# Patient Record
Sex: MALE | Race: WHITE | NOT HISPANIC OR LATINO
[De-identification: names, ages, dates, MRNs, and addresses within clinical notes are randomized per-mention and may not be internally consistent; named-entity substitution may affect disease eponyms.]

---

## 2019-05-30 ENCOUNTER — APPOINTMENT (OUTPATIENT)
Dept: FAMILY MEDICINE | Facility: CLINIC | Age: 54
End: 2019-05-30
Payer: COMMERCIAL

## 2019-05-30 VITALS
HEART RATE: 85 BPM | RESPIRATION RATE: 14 BRPM | HEIGHT: 70 IN | BODY MASS INDEX: 27.92 KG/M2 | DIASTOLIC BLOOD PRESSURE: 80 MMHG | SYSTOLIC BLOOD PRESSURE: 110 MMHG | WEIGHT: 195 LBS | OXYGEN SATURATION: 96 %

## 2019-05-30 PROCEDURE — 99213 OFFICE O/P EST LOW 20 MIN: CPT

## 2019-05-30 RX ORDER — LANCETS
EACH MISCELLANEOUS
Qty: 100 | Refills: 0 | Status: ACTIVE | COMMUNITY
Start: 2019-03-14

## 2019-05-30 RX ORDER — ALPRAZOLAM 0.25 MG/1
0.25 TABLET ORAL
Qty: 68 | Refills: 0 | Status: DISCONTINUED | COMMUNITY
Start: 2019-01-29 | End: 2019-05-30

## 2019-05-30 RX ORDER — BLOOD-GLUCOSE METER
W/DEVICE EACH MISCELLANEOUS
Qty: 1 | Refills: 0 | Status: ACTIVE | COMMUNITY
Start: 2019-03-14

## 2019-05-30 NOTE — PHYSICAL EXAM
[No Acute Distress] : no acute distress [Well Nourished] : well nourished [Well Developed] : well developed [Well-Appearing] : well-appearing [No JVD] : no jugular venous distention [Supple] : supple [No Lymphadenopathy] : no lymphadenopathy [No Respiratory Distress] : no respiratory distress  [Clear to Auscultation] : lungs were clear to auscultation bilaterally [Normal Rate] : normal rate  [Regular Rhythm] : with a regular rhythm [No Murmur] : no murmur heard [No Carotid Bruits] : no carotid bruits [Normal Posterior Cervical Nodes] : no posterior cervical lymphadenopathy [Normal Anterior Cervical Nodes] : no anterior cervical lymphadenopathy [Speech Grossly Normal] : speech grossly normal [Memory Grossly Normal] : memory grossly normal [Alert and Oriented x3] : oriented to person, place, and time [Normal Insight/Judgement] : insight and judgment were intact [de-identified] : anxious

## 2019-05-30 NOTE — HISTORY OF PRESENT ILLNESS
[FreeTextEntry1] : Patient present for med check\par  [de-identified] : Here for management of NIDDM and anxiety.

## 2019-05-30 NOTE — ASSESSMENT
[FreeTextEntry1] : 1. Diet and use of meds reviewed.  Lab due next visit. 2. Rx for alprazolam 0.5 mg  Use/precautions/safety of medication all reviewed.  [place]  checked\par

## 2019-05-30 NOTE — REVIEW OF SYSTEMS
[Joint Pain] : joint pain [Joint Stiffness] : joint stiffness [Anxiety] : anxiety [Negative] : Heme/Lymph

## 2019-08-19 ENCOUNTER — MEDICATION RENEWAL (OUTPATIENT)
Age: 54
End: 2019-08-19

## 2019-08-30 ENCOUNTER — APPOINTMENT (OUTPATIENT)
Dept: FAMILY MEDICINE | Facility: CLINIC | Age: 54
End: 2019-08-30
Payer: COMMERCIAL

## 2019-08-30 VITALS
OXYGEN SATURATION: 97 % | BODY MASS INDEX: 27.92 KG/M2 | WEIGHT: 195 LBS | HEIGHT: 70 IN | HEART RATE: 75 BPM | SYSTOLIC BLOOD PRESSURE: 120 MMHG | RESPIRATION RATE: 14 BRPM | DIASTOLIC BLOOD PRESSURE: 84 MMHG

## 2019-08-30 DIAGNOSIS — Z80.0 FAMILY HISTORY OF MALIGNANT NEOPLASM OF DIGESTIVE ORGANS: ICD-10-CM

## 2019-08-30 DIAGNOSIS — Z83.79 FAMILY HISTORY OF OTHER DISEASES OF THE DIGESTIVE SYSTEM: ICD-10-CM

## 2019-08-30 DIAGNOSIS — Z82.49 FAMILY HISTORY OF ISCHEMIC HEART DISEASE AND OTHER DISEASES OF THE CIRCULATORY SYSTEM: ICD-10-CM

## 2019-08-30 PROCEDURE — 99214 OFFICE O/P EST MOD 30 MIN: CPT | Mod: 25

## 2019-08-30 PROCEDURE — 36415 COLL VENOUS BLD VENIPUNCTURE: CPT

## 2019-08-30 NOTE — ASSESSMENT
[FreeTextEntry1] : Check labs drawn in office today for above assessed conditions. Labs to be resulted at the [facility] core lab.\par Increase dietary effort - low carbs\par RTO in 3 mos for follow up\par Advised diabetic eye exam and colonoscopy - pt agreeable to go. \par \par - Patient advised of harmful side effects and use of dependence with long term use of benzodiazepines. Advised caution related to sedative effect and and respiratory depression. Advised not to take while driving or in combination with alcohol. Meditation, mindfulness, exercise, and psychotherapy encouraged. ISTOP checked. Dr. Perez renewed on 8/19/19.\par

## 2019-08-30 NOTE — HISTORY OF PRESENT ILLNESS
[FreeTextEntry1] : patient presents for medication renewal\par  [de-identified] : Patient reports today for follow up and medication renewals. Patient reports taking xanax for his anxiety and stress. States he only take when really needed. Denies any suicidal thoughts or plans, side effects or aberrant behaviors. Pt states he has been taking his metformin but has not been as strict on his diet recently.

## 2019-08-30 NOTE — PHYSICAL EXAM
[No Edema] : there was no peripheral edema [Normal] : affect was normal and insight and judgment were intact [Comprehensive Foot Exam Normal] : Right and left foot were examined and both feet are normal. No ulcers in either foot. Toes are normal and with full ROM.  Normal tactile sensation with monofilament testing throughout both feet

## 2019-09-04 LAB
ALBUMIN SERPL ELPH-MCNC: 4.7 G/DL
ALP BLD-CCNC: 67 U/L
ALT SERPL-CCNC: 31 U/L
ANION GAP SERPL CALC-SCNC: 14 MMOL/L
AST SERPL-CCNC: 21 U/L
BASOPHILS # BLD AUTO: 0.05 K/UL
BASOPHILS NFR BLD AUTO: 1.1 %
BILIRUB SERPL-MCNC: 0.6 MG/DL
BUN SERPL-MCNC: 15 MG/DL
CALCIUM SERPL-MCNC: 9.5 MG/DL
CHLORIDE SERPL-SCNC: 96 MMOL/L
CO2 SERPL-SCNC: 28 MMOL/L
CREAT SERPL-MCNC: 0.92 MG/DL
EOSINOPHIL # BLD AUTO: 0.23 K/UL
EOSINOPHIL NFR BLD AUTO: 4.9 %
ESTIMATED AVERAGE GLUCOSE: 154 MG/DL
GLUCOSE SERPL-MCNC: 236 MG/DL
HBA1C MFR BLD HPLC: 7 %
HCT VFR BLD CALC: 43.1 %
HCV AB SER QL: NONREACTIVE
HCV S/CO RATIO: 0.09 S/CO
HGB BLD-MCNC: 15.6 G/DL
IMM GRANULOCYTES NFR BLD AUTO: 0.2 %
LYMPHOCYTES # BLD AUTO: 1.55 K/UL
LYMPHOCYTES NFR BLD AUTO: 33 %
MAN DIFF?: NORMAL
MCHC RBC-ENTMCNC: 31.6 PG
MCHC RBC-ENTMCNC: 36.2 GM/DL
MCV RBC AUTO: 87.4 FL
MONOCYTES # BLD AUTO: 0.37 K/UL
MONOCYTES NFR BLD AUTO: 7.9 %
NEUTROPHILS # BLD AUTO: 2.48 K/UL
NEUTROPHILS NFR BLD AUTO: 52.9 %
PLATELET # BLD AUTO: 182 K/UL
POTASSIUM SERPL-SCNC: 4.5 MMOL/L
PROT SERPL-MCNC: 6.8 G/DL
RBC # BLD: 4.93 M/UL
RBC # FLD: 12 %
SODIUM SERPL-SCNC: 138 MMOL/L
T4 SERPL-MCNC: 5.3 UG/DL
TSH SERPL-ACNC: 2.64 UIU/ML
WBC # FLD AUTO: 4.69 K/UL

## 2019-11-19 ENCOUNTER — MEDICATION RENEWAL (OUTPATIENT)
Age: 54
End: 2019-11-19

## 2019-12-02 ENCOUNTER — APPOINTMENT (OUTPATIENT)
Dept: FAMILY MEDICINE | Facility: CLINIC | Age: 54
End: 2019-12-02
Payer: COMMERCIAL

## 2019-12-02 VITALS
DIASTOLIC BLOOD PRESSURE: 82 MMHG | RESPIRATION RATE: 14 BRPM | WEIGHT: 195 LBS | SYSTOLIC BLOOD PRESSURE: 116 MMHG | BODY MASS INDEX: 27.92 KG/M2 | HEIGHT: 70 IN | OXYGEN SATURATION: 97 % | HEART RATE: 64 BPM

## 2019-12-02 DIAGNOSIS — Z11.3 ENCOUNTER FOR SCREENING FOR INFECTIONS WITH A PREDOMINANTLY SEXUAL MODE OF TRANSMISSION: ICD-10-CM

## 2019-12-02 DIAGNOSIS — Z12.11 ENCOUNTER FOR SCREENING FOR MALIGNANT NEOPLASM OF COLON: ICD-10-CM

## 2019-12-02 PROCEDURE — 99214 OFFICE O/P EST MOD 30 MIN: CPT

## 2019-12-02 NOTE — ASSESSMENT
[FreeTextEntry1] : Fit test given, agreeable to complete\par Advised to get diabetic eye exam \par Check labs - rx given \par - Patient advised of harmful side effects and risk of dependence with long term use of benzodiazepines. Advised caution related to sedative effect and and respiratory depression. Advised not to take while driving or in combination with alcohol. Meditation, mindfulness, exercise, and psychotherapy encouraged. ISTOP checked.Reference #: [id number] \par

## 2019-12-02 NOTE — HISTORY OF PRESENT ILLNESS
[FreeTextEntry1] : Patient presents for med check\par  [de-identified] : Patient reports today for follow up and medication renewals. Patient reports taking xanax for his anxiety and stress. He is very bust and stressed with work. States he only take when really needed. Denies any suicidal thoughts or plans, side effects or aberrant behaviors. Pt states he has been taking his metformin but has not been as strict on his diet recently.

## 2020-01-08 LAB — HEMOCCULT STL QL IA: POSITIVE

## 2020-02-13 ENCOUNTER — APPOINTMENT (OUTPATIENT)
Dept: FAMILY MEDICINE | Facility: CLINIC | Age: 55
End: 2020-02-13
Payer: COMMERCIAL

## 2020-02-13 VITALS
TEMPERATURE: 98.2 F | WEIGHT: 193 LBS | SYSTOLIC BLOOD PRESSURE: 110 MMHG | DIASTOLIC BLOOD PRESSURE: 70 MMHG | OXYGEN SATURATION: 95 % | BODY MASS INDEX: 27.63 KG/M2 | RESPIRATION RATE: 14 BRPM | HEIGHT: 70 IN | HEART RATE: 89 BPM

## 2020-02-13 PROCEDURE — 99214 OFFICE O/P EST MOD 30 MIN: CPT

## 2020-02-13 NOTE — ASSESSMENT
[FreeTextEntry1] : -DM: Check labs - pt not fasting, will check labs when fasting. Low carb diet, metformin renewed\par - Patient advised of harmful side effects and risk of dependence with long term use of benzodiazepines. Advised caution related to sedative effect and and respiratory depression. Advised not to take while driving or in combination with alcohol. Meditation, mindfulness, exercise, and psychotherapy encouraged. ISTOP checked. Reference #: [id number]

## 2020-02-13 NOTE — HISTORY OF PRESENT ILLNESS
[FreeTextEntry1] : pt present for follow up [de-identified] : Patient reports today for follow up and medication renewals. Patient reports taking xanax for his anxiety and stress. He is very busy and stressed with work. States he only takes when really needed. Denies any suicidal thoughts or plans, side effects or aberrant behaviors. Pt states he has been taking his metformin but has not been as strict on his diet recently.

## 2020-05-28 ENCOUNTER — APPOINTMENT (OUTPATIENT)
Dept: FAMILY MEDICINE | Facility: CLINIC | Age: 55
End: 2020-05-28
Payer: MEDICAID

## 2020-05-28 VITALS
OXYGEN SATURATION: 97 % | TEMPERATURE: 98.3 F | WEIGHT: 195 LBS | BODY MASS INDEX: 27.92 KG/M2 | HEART RATE: 79 BPM | HEIGHT: 70 IN | RESPIRATION RATE: 14 BRPM | DIASTOLIC BLOOD PRESSURE: 70 MMHG | SYSTOLIC BLOOD PRESSURE: 110 MMHG

## 2020-05-28 DIAGNOSIS — Z12.5 ENCOUNTER FOR SCREENING FOR MALIGNANT NEOPLASM OF PROSTATE: ICD-10-CM

## 2020-05-28 PROCEDURE — 99214 OFFICE O/P EST MOD 30 MIN: CPT

## 2020-05-28 NOTE — HISTORY OF PRESENT ILLNESS
[FreeTextEntry1] : Patient presents for medication renewal\par  [de-identified] : Patient reports today for follow up and medication renewals. Patient reports taking xanax for his anxiety and stress. He is very busy and stressed with work. States he only takes when really needed. Denies any suicidal thoughts or plans, side effects or aberrant behaviors. Pt states he has been taking his metformin but has not been as strict on his diet recently.

## 2020-05-28 NOTE — ASSESSMENT
[FreeTextEntry1] : -DM: Check labs - pt not fasting, will check labs when fasting. Low carb diet, metformin renewed, Pt advised to get diabetic eye exam - pt agreeable to plan\par - Patient advised of harmful side effects and risk of dependence with long term use of benzodiazepines. Advised caution related to sedative effect and and respiratory depression. Advised not to take while driving or in combination with alcohol. Meditation, mindfulness, exercise, and psychotherapy encouraged. ISTOP checked. Reference #: [id number] \par

## 2020-08-31 DIAGNOSIS — M25.512 PAIN IN LEFT SHOULDER: ICD-10-CM

## 2020-10-13 ENCOUNTER — APPOINTMENT (OUTPATIENT)
Dept: FAMILY MEDICINE | Facility: CLINIC | Age: 55
End: 2020-10-13
Payer: MEDICAID

## 2020-10-13 VITALS
BODY MASS INDEX: 27.92 KG/M2 | HEART RATE: 84 BPM | TEMPERATURE: 97.3 F | SYSTOLIC BLOOD PRESSURE: 120 MMHG | DIASTOLIC BLOOD PRESSURE: 70 MMHG | OXYGEN SATURATION: 97 % | WEIGHT: 195 LBS | RESPIRATION RATE: 14 BRPM | HEIGHT: 70 IN

## 2020-10-13 DIAGNOSIS — Z13.0 ENCOUNTER FOR SCREENING FOR DISEASES OF THE BLOOD AND BLOOD-FORMING ORGANS AND CERTAIN DISORDERS INVOLVING THE IMMUNE MECHANISM: ICD-10-CM

## 2020-10-13 DIAGNOSIS — Z13.29 ENCOUNTER FOR SCREENING FOR OTHER SUSPECTED ENDOCRINE DISORDER: ICD-10-CM

## 2020-10-13 PROCEDURE — 99213 OFFICE O/P EST LOW 20 MIN: CPT

## 2020-10-13 NOTE — PLAN
[FreeTextEntry1] : Anxiety\par istop Reference #: [id number] \par discussed use of xanax, patient is open to trying to decrease use of xanax and start different SSRI for baseline control\par Discussed sertraline for baseline control. Patient is open to trying this\par Sending starting dose to pharmacy, also sending small quantity of xanax with goal of decreasing dependence of this\par utox ordered, contract signed\par denied SI/HI\par do not mix xanax with alcohol or when driving \par \par DM2\par checking a1c and microalbumin\par renewed metformin\par \par f/u in 3-4 weeks to assess\par Patient agrees with plan, all further questions answered during encounter.\par

## 2020-10-13 NOTE — HISTORY OF PRESENT ILLNESS
[FreeTextEntry1] : patient presents for medication renewal\par  [de-identified] : 54 yo male, pmh of dm2 on metformin, anxiety, presents today for follow up. Says that he takes xanax as needed. Some days he takes it, some days it does not depending on how anxious he feels. Says Some days are better than others, Some days can go full day without it.\par Nothing worsening, is just anxious in his background thoughts. Has seen psych in the past, many years ago, prescribed lexapro but did not tolerate because of how it made him feel. \par No thoughts of SI/HI. \par Also requests refill of metformin. \par

## 2020-10-14 RX ORDER — BLOOD SUGAR DIAGNOSTIC
STRIP MISCELLANEOUS
Qty: 1 | Refills: 5 | Status: ACTIVE | COMMUNITY
Start: 2020-10-14 | End: 1900-01-01

## 2020-10-14 RX ORDER — LANCETS 28 GAUGE
EACH MISCELLANEOUS
Qty: 1 | Refills: 5 | Status: ACTIVE | COMMUNITY
Start: 2020-10-14 | End: 1900-01-01

## 2020-10-16 LAB
CREAT SPEC-SCNC: 15 MG/DL
MICROALBUMIN 24H UR DL<=1MG/L-MCNC: <1.2 MG/DL
MICROALBUMIN/CREAT 24H UR-RTO: NORMAL MG/G

## 2020-10-17 LAB — COMPREHENSIVE SCREEN URINE: NORMAL

## 2020-11-09 RX ORDER — BLOOD SUGAR DIAGNOSTIC
STRIP MISCELLANEOUS
Qty: 1 | Refills: 5 | Status: ACTIVE | COMMUNITY
Start: 2019-03-14 | End: 1900-01-01

## 2020-11-10 ENCOUNTER — APPOINTMENT (OUTPATIENT)
Dept: FAMILY MEDICINE | Facility: CLINIC | Age: 55
End: 2020-11-10

## 2020-12-29 ENCOUNTER — NON-APPOINTMENT (OUTPATIENT)
Age: 55
End: 2020-12-29

## 2020-12-29 ENCOUNTER — APPOINTMENT (OUTPATIENT)
Dept: FAMILY MEDICINE | Facility: CLINIC | Age: 55
End: 2020-12-29
Payer: MEDICAID

## 2020-12-29 VITALS
TEMPERATURE: 94.7 F | RESPIRATION RATE: 14 BRPM | HEIGHT: 70 IN | WEIGHT: 189 LBS | SYSTOLIC BLOOD PRESSURE: 102 MMHG | BODY MASS INDEX: 27.06 KG/M2 | OXYGEN SATURATION: 99 % | HEART RATE: 66 BPM | DIASTOLIC BLOOD PRESSURE: 70 MMHG

## 2020-12-29 DIAGNOSIS — R82.90 UNSPECIFIED ABNORMAL FINDINGS IN URINE: ICD-10-CM

## 2020-12-29 DIAGNOSIS — K42.9 UMBILICAL HERNIA W/OUT OBSTRUCTION OR GANGRENE: ICD-10-CM

## 2020-12-29 DIAGNOSIS — R39.89 OTHER SYMPTOMS AND SIGNS INVOLVING THE GENITOURINARY SYSTEM: ICD-10-CM

## 2020-12-29 LAB
BILIRUB UR QL STRIP: NEGATIVE
CLARITY UR: CLEAR
COLLECTION METHOD: NORMAL
GLUCOSE UR-MCNC: NEGATIVE
HCG UR QL: 0.2 EU/DL
HGB UR QL STRIP.AUTO: NORMAL
KETONES UR-MCNC: NEGATIVE
LEUKOCYTE ESTERASE UR QL STRIP: NEGATIVE
NITRITE UR QL STRIP: NEGATIVE
PH UR STRIP: 5.5
PROT UR STRIP-MCNC: NEGATIVE
SP GR UR STRIP: >=1.03

## 2020-12-29 PROCEDURE — 93000 ELECTROCARDIOGRAM COMPLETE: CPT

## 2020-12-29 PROCEDURE — 99396 PREV VISIT EST AGE 40-64: CPT | Mod: 25

## 2020-12-29 PROCEDURE — 99072 ADDL SUPL MATRL&STAF TM PHE: CPT

## 2020-12-29 PROCEDURE — 81003 URINALYSIS AUTO W/O SCOPE: CPT | Mod: QW

## 2020-12-29 NOTE — HEALTH RISK ASSESSMENT
[No falls in past year] : Patient reported no falls in the past year [HIV test declined] : HIV test declined [Hepatitis C test declined] : Hepatitis C test declined [Fully functional (bathing, dressing, toileting, transferring, walking, feeding)] : Fully functional (bathing, dressing, toileting, transferring, walking, feeding) [Fully functional (using the telephone, shopping, preparing meals, housekeeping, doing laundry, using] : Fully functional and needs no help or supervision to perform IADLs (using the telephone, shopping, preparing meals, housekeeping, doing laundry, using transportation, managing medications and managing finances) [Reports changes in hearing] : Reports no changes in hearing [Reports changes in vision] : Reports no changes in vision [Reports changes in dental health] : Reports no changes in dental health

## 2020-12-29 NOTE — HISTORY OF PRESENT ILLNESS
[FreeTextEntry1] : Patient present for physical\par  [de-identified] : 54yo M presents for cpe\par he is feeling well\par

## 2020-12-29 NOTE — REVIEW OF SYSTEMS
[Negative] : Heme/Lymph [Chest Pain] : no chest pain [Palpitations] : no palpitations [Dysuria] : no dysuria [Hesitancy] : no hesitancy [Nocturia] : no nocturia [Hematuria] : no hematuria [Itching] : no itching [Mole Changes] : no mole changes [Skin Rash] : no skin rash

## 2020-12-29 NOTE — PHYSICAL EXAM
[No Lymphadenopathy] : no lymphadenopathy [Supple] : supple [Normal] : soft, non-tender, non-distended, no masses palpated, no HSM and normal bowel sounds [Normal Posterior Cervical Nodes] : no posterior cervical lymphadenopathy [Normal Anterior Cervical Nodes] : no anterior cervical lymphadenopathy [No CVA Tenderness] : no CVA  tenderness [No Rash] : no rash [No Focal Deficits] : no focal deficits [Normal Affect] : the affect was normal [Normal Mood] : the mood was normal [Normal Insight/Judgement] : insight and judgment were intact [No Edema] : there was no peripheral edema [de-identified] : thyroid feels enlarged [de-identified] : no calf tenderness b/l LE [de-identified] : +umbilical hernia palpated  [FreeTextEntry1] : chaperone ida, prostate feels nodular  [de-identified] : CN 2-12 INTACT, NORMAL STRENGTH UPPER AND LOWER EXT B/L 5/5, NORMAL RAPID ALTERNATING MOVEMENTS AND FINGER TO NOSE

## 2020-12-29 NOTE — PLAN
[FreeTextEntry1] : \par \par CPE\par -Labs done already but abnormal liver enzyme repeat needs microalbumin and also psa\par -Offered HIV/ STD/ Hep C Screening declined \par -Colonoscopy Screening\par -Advised annual ophthalmology, dental and dermatology visits\par -Annual depression screening - negative \par -urine dip neg  protein neg   leuks  neg  nitrites  +trace intact blood- will send out UA \par \par -EKG- NSR @   61 BPM, NORMAL AXIS, NORMAL WI/QT INTERVAL, NO ST/ T WAVE ABNORMALITIES NOTED EXCEPT T WAVE INVERSION LEAD III \par EKG reviewed\par CARDIO CONSULT\par NO EKG TO COMPARE\par \par prostate feels enlarged\par urologist consult\par psa\par \par thyroid feels enlarged\par us neck\par tsh with reflex\par \par anxiety\par stopped sertraline'\par advised to restart as he said he had abd discomfort but only took for about 1week, advised the side effects should decrease and good effects kick in around 2 weeks\par refilled xanax at his request for lower dose 0.25mg po bid prn and he said he will try to take 1/2 a tab \par istopped\par Reference #: [id number]\par \par advised if patient doesn’t hear from me 1 week after testing to call office for results , patient agreed w/plan and understood.\par

## 2020-12-30 LAB
APPEARANCE: CLEAR
BILIRUBIN URINE: NEGATIVE
BLOOD URINE: NEGATIVE
COLOR: NORMAL
GLUCOSE QUALITATIVE U: NEGATIVE
KETONES URINE: NEGATIVE
LEUKOCYTE ESTERASE URINE: NEGATIVE
NITRITE URINE: NEGATIVE
PH URINE: 5.5
PROTEIN URINE: NEGATIVE
SPECIFIC GRAVITY URINE: 1.02
UROBILINOGEN URINE: NORMAL

## 2021-01-03 ENCOUNTER — TRANSCRIPTION ENCOUNTER (OUTPATIENT)
Age: 56
End: 2021-01-03

## 2021-01-20 ENCOUNTER — NON-APPOINTMENT (OUTPATIENT)
Age: 56
End: 2021-01-20

## 2021-02-04 ENCOUNTER — APPOINTMENT (OUTPATIENT)
Dept: CARDIOLOGY | Facility: CLINIC | Age: 56
End: 2021-02-04
Payer: MEDICAID

## 2021-02-04 ENCOUNTER — NON-APPOINTMENT (OUTPATIENT)
Age: 56
End: 2021-02-04

## 2021-02-04 VITALS
DIASTOLIC BLOOD PRESSURE: 70 MMHG | HEIGHT: 70 IN | HEART RATE: 69 BPM | SYSTOLIC BLOOD PRESSURE: 110 MMHG | OXYGEN SATURATION: 96 % | BODY MASS INDEX: 26.77 KG/M2 | WEIGHT: 187 LBS

## 2021-02-04 PROCEDURE — 99072 ADDL SUPL MATRL&STAF TM PHE: CPT

## 2021-02-04 PROCEDURE — 99204 OFFICE O/P NEW MOD 45 MIN: CPT | Mod: 25

## 2021-02-04 PROCEDURE — 93000 ELECTROCARDIOGRAM COMPLETE: CPT

## 2021-02-04 RX ORDER — SERTRALINE 25 MG/1
25 TABLET, FILM COATED ORAL
Qty: 60 | Refills: 1 | Status: DISCONTINUED | COMMUNITY
Start: 2020-10-13 | End: 2021-02-04

## 2021-02-04 NOTE — PHYSICAL EXAM
[General Appearance - Well Developed] : well developed [Normal Appearance] : normal appearance [Well Groomed] : well groomed [General Appearance - Well Nourished] : well nourished [No Deformities] : no deformities [General Appearance - In No Acute Distress] : no acute distress [Normal Conjunctiva] : the conjunctiva exhibited no abnormalities [Eyelids - No Xanthelasma] : the eyelids demonstrated no xanthelasmas [Normal Oral Mucosa] : normal oral mucosa [No Oral Pallor] : no oral pallor [No Oral Cyanosis] : no oral cyanosis [Heart Rate And Rhythm] : heart rate and rhythm were normal [Heart Sounds] : normal S1 and S2 [Murmurs] : no murmurs present [Arterial Pulses Normal] : the arterial pulses were normal [Edema] : no peripheral edema present [Respiration, Rhythm And Depth] : normal respiratory rhythm and effort [Exaggerated Use Of Accessory Muscles For Inspiration] : no accessory muscle use [Auscultation Breath Sounds / Voice Sounds] : lungs were clear to auscultation bilaterally [Abdomen Soft] : soft [Abdomen Tenderness] : non-tender [Abdomen Mass (___ Cm)] : no abdominal mass palpated [Abnormal Walk] : normal gait [Gait - Sufficient For Exercise Testing] : the gait was sufficient for exercise testing [Nail Clubbing] : no clubbing of the fingernails [Cyanosis, Localized] : no localized cyanosis [Petechial Hemorrhages (___cm)] : no petechial hemorrhages [] : no ischemic changes [Oriented To Time, Place, And Person] : oriented to person, place, and time [Affect] : the affect was normal [Impaired Insight] : insight and judgment were intact [Mood] : the mood was normal [No Anxiety] : not feeling anxious [FreeTextEntry1] : no JVD or bruits

## 2021-02-04 NOTE — DISCUSSION/SUMMARY
[FreeTextEntry1] : Pt is a 54 y/o M with PMH DM, father CABG 64\par ECG NSR low voltage\par Will check transthoracic echocardiogram to evaluate left ventricular function and assess for any structural abnormalities\par will perform ETT to assess patient's current cardiac reserve to incremental activity and check for provocable ECG changes.\par VSS\par DM: Advised lifestyle modifications  Consider starting ACEi for renal protection, plus statin for goal LDL <70\par The described plan was discussed with the pt.  All questions and concerns were addressed to the best of my knowledge.

## 2021-02-04 NOTE — HISTORY OF PRESENT ILLNESS
[FreeTextEntry1] : Pt is a 54 y/o M who is referred here today by their PCP for evaluation abormal ECG.  Pt has PMH DM.  He states that he is feeling well overall.  He notes that he occasional rapid heartbeat at night which he wakes up in the middle of the night - he thinks it might be related to bad dream or anxiety.  He denies CP, SOB, diaphoresis,  dizziness, syncope, LE edema, PND, orthopnea. \par Family hx: father CABG 64\par \par PMH: DM\par Smoking status: never\par social ETOH\par no drug use\par Current exercise: none recently\par Daily water intake: 64 oz\par Daily caffeine intake: not regularly\par OTC medications: none\par NKDA\par Previous cardiac testing: none\par Previous hospitalizations: carpal tunnel surgery - no problems with anesthesia\par

## 2021-04-02 ENCOUNTER — APPOINTMENT (OUTPATIENT)
Dept: FAMILY MEDICINE | Facility: CLINIC | Age: 56
End: 2021-04-02
Payer: MEDICAID

## 2021-04-02 VITALS
DIASTOLIC BLOOD PRESSURE: 78 MMHG | OXYGEN SATURATION: 95 % | WEIGHT: 185 LBS | RESPIRATION RATE: 14 BRPM | HEIGHT: 70 IN | HEART RATE: 71 BPM | TEMPERATURE: 97.5 F | SYSTOLIC BLOOD PRESSURE: 112 MMHG | BODY MASS INDEX: 26.48 KG/M2

## 2021-04-02 PROCEDURE — 99072 ADDL SUPL MATRL&STAF TM PHE: CPT

## 2021-04-02 PROCEDURE — 99212 OFFICE O/P EST SF 10 MIN: CPT | Mod: 25

## 2021-04-02 NOTE — PLAN
[FreeTextEntry1] : 56 yr old male presented for medication refills. \par \par Continue all medications as directed \par Will check an A1c and CMP today with goal <7 \par pt reports not seeing a podiatrist or opthalmology\par he is currently not on a ACE/ARB will check urine for microalbumin \par advised strict DM diet and physical activity as tolerated. \par \par \par RTO in 3 months for follow up.

## 2021-04-02 NOTE — HISTORY OF PRESENT ILLNESS
[FreeTextEntry1] : patient presents for medication renewal\par  [de-identified] : 56 yr old male presented for medication refills. He had a recent SEBASTIAN with normal results and is planned to have a stress test. States he is taking all prescribed medications. He was diagnosed with IBS and currently is well controlled. He did not go for thyroid sonogram because "there is no issue". He would like refill on Xanax for his anxiety

## 2021-04-06 ENCOUNTER — RX RENEWAL (OUTPATIENT)
Age: 56
End: 2021-04-06

## 2021-04-09 ENCOUNTER — TRANSCRIPTION ENCOUNTER (OUTPATIENT)
Age: 56
End: 2021-04-09

## 2021-04-20 ENCOUNTER — APPOINTMENT (OUTPATIENT)
Dept: CARDIOLOGY | Facility: CLINIC | Age: 56
End: 2021-04-20
Payer: MEDICAID

## 2021-04-20 PROCEDURE — 99072 ADDL SUPL MATRL&STAF TM PHE: CPT

## 2021-04-20 PROCEDURE — 93306 TTE W/DOPPLER COMPLETE: CPT

## 2021-05-03 ENCOUNTER — APPOINTMENT (OUTPATIENT)
Dept: FAMILY MEDICINE | Facility: CLINIC | Age: 56
End: 2021-05-03
Payer: MEDICAID

## 2021-05-03 VITALS
RESPIRATION RATE: 14 BRPM | SYSTOLIC BLOOD PRESSURE: 120 MMHG | TEMPERATURE: 96.7 F | OXYGEN SATURATION: 98 % | HEIGHT: 70 IN | WEIGHT: 185 LBS | BODY MASS INDEX: 26.48 KG/M2 | HEART RATE: 75 BPM | DIASTOLIC BLOOD PRESSURE: 74 MMHG

## 2021-05-03 PROCEDURE — 99072 ADDL SUPL MATRL&STAF TM PHE: CPT

## 2021-05-03 PROCEDURE — 99213 OFFICE O/P EST LOW 20 MIN: CPT

## 2021-05-03 NOTE — HISTORY OF PRESENT ILLNESS
[FreeTextEntry1] : Patient presents for medication renewal and follow up for A1C [de-identified] : 56 yr old male presented for follow up. He has upcoming dental procedure due to dental abscess. Admits to taking penicillin and pain with swelling. He has a upcoming stress test. He is taking 500 mg TID of metformin and reports his FS range from 140-150's. He is walking daily and has lost up to 15 lbs.  Denies changes in vision, dizziness, chest pain, palpitations and lower extremity edema.\par

## 2021-05-03 NOTE — PHYSICAL EXAM
[No Acute Distress] : no acute distress [Well Nourished] : well nourished [Well Developed] : well developed [Well-Appearing] : well-appearing [Normal Sclera/Conjunctiva] : normal sclera/conjunctiva [Normal Outer Ear/Nose] : the outer ears and nose were normal in appearance [No JVD] : no jugular venous distention [No Respiratory Distress] : no respiratory distress  [No Accessory Muscle Use] : no accessory muscle use [Clear to Auscultation] : lungs were clear to auscultation bilaterally [Normal Rate] : normal rate  [Regular Rhythm] : with a regular rhythm [Normal S1, S2] : normal S1 and S2 [No Murmur] : no murmur heard [No Carotid Bruits] : no carotid bruits [No Extremity Clubbing/Cyanosis] : no extremity clubbing/cyanosis [Kyphosis] : no kyphosis [Scoliosis] : no scoliosis [Normal Gait] : normal gait [Normal Affect] : the affect was normal [Alert and Oriented x3] : oriented to person, place, and time [Normal Insight/Judgement] : insight and judgment were intact

## 2021-05-03 NOTE — PLAN
[FreeTextEntry1] : 56 yr old male medications refills. \par \par Continue current medication for DM type II\par advised to follow diet low in Carb and sugars \par continue daily physical activity as tolerated \par f/u with cardio for upcoming stress test \par we had a discussion regarding starting ACE as kidney protective, pt would like to wait \par he is opposed to statin therapy as well \par I have advised patient to see ophthalmologist\par \par ISTOP: Reference #: [id number] \par Patient advised harmful side effects and risk of dependence with long term use of benzodiazepines.\par Advised caution related to sedative effects and possible respiratory depression. Advised not to take medication while driving  or in combination with alcohol.\par \par RTO in 1 month for xanax refills. \par \par

## 2021-05-04 ENCOUNTER — RX RENEWAL (OUTPATIENT)
Age: 56
End: 2021-05-04

## 2021-07-06 ENCOUNTER — APPOINTMENT (OUTPATIENT)
Dept: FAMILY MEDICINE | Facility: CLINIC | Age: 56
End: 2021-07-06
Payer: MEDICAID

## 2021-07-06 VITALS
SYSTOLIC BLOOD PRESSURE: 120 MMHG | WEIGHT: 185 LBS | BODY MASS INDEX: 26.48 KG/M2 | HEART RATE: 75 BPM | DIASTOLIC BLOOD PRESSURE: 76 MMHG | HEIGHT: 70 IN | OXYGEN SATURATION: 98 % | RESPIRATION RATE: 14 BRPM

## 2021-07-06 VITALS — TEMPERATURE: 97 F

## 2021-07-06 DIAGNOSIS — H93.13 TINNITUS, BILATERAL: ICD-10-CM

## 2021-07-06 DIAGNOSIS — H61.23 IMPACTED CERUMEN, BILATERAL: ICD-10-CM

## 2021-07-06 PROCEDURE — 99213 OFFICE O/P EST LOW 20 MIN: CPT

## 2021-07-06 NOTE — HISTORY OF PRESENT ILLNESS
[FreeTextEntry8] : pt c/o of right ear clogged & referral for audiologist.  He also has had a long history of tinnitus and would like to have a ENT consult and also would like to be referred for a routine eye exam

## 2021-07-06 NOTE — REVIEW OF SYSTEMS
[Vision Problems] : no vision problems [Earache] : no earache [Negative] : Heme/Lymph [FreeTextEntry4] : clogged feeling in right ear, ringing in both ears

## 2021-07-06 NOTE — PLAN
[FreeTextEntry1] : ENT and ophthalmology consultations were advised and he will follow up as regularly scheduled

## 2021-07-06 NOTE — PHYSICAL EXAM
[No Acute Distress] : no acute distress [Well Nourished] : well nourished [Well Developed] : well developed [Well-Appearing] : well-appearing [Normal Voice/Communication] : normal voice/communication [Normal Sclera/Conjunctiva] : normal sclera/conjunctiva [No Lymphadenopathy] : no lymphadenopathy [Supple] : supple [No Respiratory Distress] : no respiratory distress  [Normal Mood] : the mood was normal [de-identified] : cerumen in ear canals bilaterally

## 2021-07-14 ENCOUNTER — APPOINTMENT (OUTPATIENT)
Dept: CARDIOLOGY | Facility: CLINIC | Age: 56
End: 2021-07-14
Payer: MEDICAID

## 2021-07-14 PROCEDURE — 93015 CV STRESS TEST SUPVJ I&R: CPT

## 2021-08-02 ENCOUNTER — APPOINTMENT (OUTPATIENT)
Dept: FAMILY MEDICINE | Facility: CLINIC | Age: 56
End: 2021-08-02
Payer: MEDICAID

## 2021-08-02 VITALS
OXYGEN SATURATION: 97 % | RESPIRATION RATE: 12 BRPM | WEIGHT: 185 LBS | HEART RATE: 68 BPM | HEIGHT: 70 IN | SYSTOLIC BLOOD PRESSURE: 118 MMHG | DIASTOLIC BLOOD PRESSURE: 80 MMHG | BODY MASS INDEX: 26.48 KG/M2

## 2021-08-02 PROCEDURE — 99212 OFFICE O/P EST SF 10 MIN: CPT

## 2021-08-02 NOTE — PHYSICAL EXAM
[No Acute Distress] : no acute distress [No JVD] : no jugular venous distention [No Respiratory Distress] : no respiratory distress  [No Accessory Muscle Use] : no accessory muscle use [Normal Rate] : normal rate  [Regular Rhythm] : with a regular rhythm [Normal S1, S2] : normal S1 and S2 [No Murmur] : no murmur heard [No Extremity Clubbing/Cyanosis] : no extremity clubbing/cyanosis [Kyphosis] : no kyphosis [Normal Gait] : normal gait [Normal Affect] : the affect was normal

## 2021-08-02 NOTE — END OF VISIT
[FreeTextEntry3] : 15 minutes was spent with patient. Extensive discussion regarding medical compliance with medications, healthy lifestyle and importance of behavioral health.\par  [Time Spent: ___ minutes] : I have spent [unfilled] minutes of time on the encounter.

## 2021-08-02 NOTE — PLAN
[FreeTextEntry1] : 56 yr old male follow up. \par \par continue all medications as directed \par healthy diet and physical activity as tolerated\par pt is not on Statin and ACEi discussion today\par will repeat A1c and lipid in 3 months \par \par refilled Xanax today:\par  I-STOP:   #: [id number] \par Patient advised harmful side effects and risk of dependence with long term use of benzodiazepines.\par Advised caution related to sedative effects and possible respiratory depression. Advised not to take medication while driving  or in combination with alcohol.\par \par RTO as routine for follow up.\par

## 2021-08-02 NOTE — HISTORY OF PRESENT ILLNESS
[FreeTextEntry1] : pt presents for med check  [de-identified] : 56 yr old male is here today for medication refills Denies changes in vision, dizziness, chest pain, palpitations and lower extremity edema.\par

## 2021-08-03 ENCOUNTER — RX RENEWAL (OUTPATIENT)
Age: 56
End: 2021-08-03

## 2021-10-15 ENCOUNTER — RX RENEWAL (OUTPATIENT)
Age: 56
End: 2021-10-15

## 2021-10-19 ENCOUNTER — RX RENEWAL (OUTPATIENT)
Age: 56
End: 2021-10-19

## 2021-11-22 ENCOUNTER — APPOINTMENT (OUTPATIENT)
Dept: FAMILY MEDICINE | Facility: CLINIC | Age: 56
End: 2021-11-22
Payer: MEDICAID

## 2021-11-22 VITALS
OXYGEN SATURATION: 97 % | DIASTOLIC BLOOD PRESSURE: 78 MMHG | SYSTOLIC BLOOD PRESSURE: 120 MMHG | HEART RATE: 72 BPM | HEIGHT: 70 IN | BODY MASS INDEX: 27.2 KG/M2 | WEIGHT: 190 LBS | RESPIRATION RATE: 12 BRPM

## 2021-11-22 VITALS — TEMPERATURE: 97.3 F

## 2021-11-22 LAB — HBA1C MFR BLD HPLC: 6.3

## 2021-11-22 PROCEDURE — 99212 OFFICE O/P EST SF 10 MIN: CPT | Mod: 25

## 2021-11-22 PROCEDURE — 83036 HEMOGLOBIN GLYCOSYLATED A1C: CPT | Mod: QW

## 2021-11-22 NOTE — PHYSICAL EXAM
[No Acute Distress] : no acute distress [Normal Sclera/Conjunctiva] : normal sclera/conjunctiva [Normal Outer Ear/Nose] : the outer ears and nose were normal in appearance [No JVD] : no jugular venous distention [No Respiratory Distress] : no respiratory distress  [No Accessory Muscle Use] : no accessory muscle use [Clear to Auscultation] : lungs were clear to auscultation bilaterally [Normal Rate] : normal rate  [Regular Rhythm] : with a regular rhythm [Normal S1, S2] : normal S1 and S2 [No Murmur] : no murmur heard [No Extremity Clubbing/Cyanosis] : no extremity clubbing/cyanosis [Normal Gait] : normal gait [Normal Affect] : the affect was normal [Alert and Oriented x3] : oriented to person, place, and time [Normal Insight/Judgement] : insight and judgment were intact

## 2021-11-22 NOTE — PLAN
[FreeTextEntry1] : 56 yr old male follow up. \par \par continue all medications as directed \par I-STOP: #: [id number] \par Patient advised harmful side effects and risk of dependence with long term use of benzodiazepines. \par Advised caution related to sedative effects and possible respiratory depression. Advised not to take medication while driving  or in combination with alcohol.\par \par healthy diet and physical activity as tolerated\par in office A1c 6.3 \par goal <7 \par declined flu shot \par will check fasting blood work next visit for CPE \par RTO as routine for follow up.\par

## 2021-11-22 NOTE — HISTORY OF PRESENT ILLNESS
[FreeTextEntry1] : pt presents for med check  [de-identified] : 56 yr old male is here for follow up. Reports having DM eye exam and podiatry. He also has colonoscopy with normal results. Denies changes in vision, dizziness, chest pain, palpitations and lower extremity edema.\par

## 2022-01-21 ENCOUNTER — RX RENEWAL (OUTPATIENT)
Age: 57
End: 2022-01-21

## 2022-02-01 ENCOUNTER — APPOINTMENT (OUTPATIENT)
Dept: FAMILY MEDICINE | Facility: CLINIC | Age: 57
End: 2022-02-01
Payer: MEDICAID

## 2022-02-01 VITALS
RESPIRATION RATE: 15 BRPM | SYSTOLIC BLOOD PRESSURE: 110 MMHG | HEIGHT: 70 IN | TEMPERATURE: 97.1 F | OXYGEN SATURATION: 97 % | DIASTOLIC BLOOD PRESSURE: 80 MMHG | WEIGHT: 194.6 LBS | BODY MASS INDEX: 27.86 KG/M2 | HEART RATE: 66 BPM

## 2022-02-01 PROCEDURE — 99396 PREV VISIT EST AGE 40-64: CPT | Mod: 25

## 2022-02-01 PROCEDURE — 99213 OFFICE O/P EST LOW 20 MIN: CPT | Mod: 25

## 2022-02-01 PROCEDURE — G0444 DEPRESSION SCREEN ANNUAL: CPT | Mod: 59

## 2022-02-01 NOTE — PHYSICAL EXAM
[No Acute Distress] : no acute distress [Normal Sclera/Conjunctiva] : normal sclera/conjunctiva [PERRL] : pupils equal round and reactive to light [EOMI] : extraocular movements intact [Normal Outer Ear/Nose] : the outer ears and nose were normal in appearance [No JVD] : no jugular venous distention [No Lymphadenopathy] : no lymphadenopathy [Supple] : supple [Thyroid Normal, No Nodules] : the thyroid was normal and there were no nodules present [No Respiratory Distress] : no respiratory distress  [No Accessory Muscle Use] : no accessory muscle use [Clear to Auscultation] : lungs were clear to auscultation bilaterally [Normal Rate] : normal rate  [Regular Rhythm] : with a regular rhythm [Normal S1, S2] : normal S1 and S2 [No Murmur] : no murmur heard [No Carotid Bruits] : no carotid bruits [No Edema] : there was no peripheral edema [No Extremity Clubbing/Cyanosis] : no extremity clubbing/cyanosis [Soft] : abdomen soft [Non Tender] : non-tender [Non-distended] : non-distended [No HSM] : no HSM [Normal Bowel Sounds] : normal bowel sounds [Normal Posterior Cervical Nodes] : no posterior cervical lymphadenopathy [Normal Anterior Cervical Nodes] : no anterior cervical lymphadenopathy [No Spinal Tenderness] : no spinal tenderness [Grossly Normal Strength/Tone] : grossly normal strength/tone [No Focal Deficits] : no focal deficits [Normal Gait] : normal gait [Normal Affect] : the affect was normal [Normal Insight/Judgement] : insight and judgment were intact [Kyphosis] : no kyphosis [de-identified] : bilateral impacted cerumen  [de-identified] : intact DP and TP bilaterally

## 2022-02-01 NOTE — HEALTH RISK ASSESSMENT
[Excellent] : ~his/her~  mood as  excellent [Never] : Never [No] : In the past 12 months have you used drugs other than those required for medical reasons? No [No falls in past year] : Patient reported no falls in the past year [0] : 2) Feeling down, depressed, or hopeless: Not at all (0) [PHQ-2 Negative - No further assessment needed] : PHQ-2 Negative - No further assessment needed [Patient reported colonoscopy was normal] : Patient reported colonoscopy was normal [HIV test declined] : HIV test declined [Hepatitis C test declined] : Hepatitis C test declined [None] : None [With Family] : lives with family [Employed] : employed [] :  [# Of Children ___] : has [unfilled] children [Feels Safe at Home] : Feels safe at home [Fully functional (bathing, dressing, toileting, transferring, walking, feeding)] : Fully functional (bathing, dressing, toileting, transferring, walking, feeding) [Fully functional (using the telephone, shopping, preparing meals, housekeeping, doing laundry, using] : Fully functional and needs no help or supervision to perform IADLs (using the telephone, shopping, preparing meals, housekeeping, doing laundry, using transportation, managing medications and managing finances) [Smoke Detector] : smoke detector [Carbon Monoxide Detector] : carbon monoxide detector [Safety elements used in home] : safety elements used in home [Seat Belt] :  uses seat belt [Sunscreen] : uses sunscreen [FreeTextEntry1] : none  [de-identified] : no  [de-identified] : cardio, urologist, GI, audiologist and opthalmology, podiatry  and Derm  [de-identified] : walks daily  [de-identified] : regular  [[id number]] : 0 [Change in mental status noted] : No change in mental status noted [Reports changes in hearing] : Reports no changes in hearing [Reports changes in vision] : Reports no changes in vision [Reports normal functional visual acuity (ie: able to read med bottle)] : Reports poor functional visual acuity.  [Reports changes in dental health] : Reports no changes in dental health [ColonoscopyDate] : 2015 [AdvancecareDate] : 2/1/2022

## 2022-02-01 NOTE — HISTORY OF PRESENT ILLNESS
[FreeTextEntry1] : Patient presents for physical exam  [de-identified] : 56 yr old male is here for physical examination. States he had COVID recently and is currently fine. Denies changes in vision, dizziness, chest pain, palpitations and lower extremity edema.\par

## 2022-02-01 NOTE — HISTORY OF PRESENT ILLNESS
[FreeTextEntry1] : Patient presents for physical exam  [de-identified] : 56 yr old male is here for physical examination. States he had COVID recently and is currently fine. Denies changes in vision, dizziness, chest pain, palpitations and lower extremity edema.\par

## 2022-02-01 NOTE — PLAN
[FreeTextEntry1] : 56 yr old male CPE \par \par continue all medications as directed \par healthy diet and physical activity as tolerated\par pt is up to date with colonoscopy along with DM health maintence \par pt is not on statin and or ACE/ARB \par given script for  routine lab work today to screen for anemia, CAD, liver and kidney abnormalities, and thyroid disorders (CBC, CMP, lipid, TSH, A1c and microalbumin) \par \par \par RTO as routine for follow up.\par

## 2022-02-01 NOTE — HEALTH RISK ASSESSMENT
[Excellent] : ~his/her~  mood as  excellent [Never] : Never [No] : In the past 12 months have you used drugs other than those required for medical reasons? No [No falls in past year] : Patient reported no falls in the past year [0] : 2) Feeling down, depressed, or hopeless: Not at all (0) [PHQ-2 Negative - No further assessment needed] : PHQ-2 Negative - No further assessment needed [Patient reported colonoscopy was normal] : Patient reported colonoscopy was normal [HIV test declined] : HIV test declined [Hepatitis C test declined] : Hepatitis C test declined [None] : None [With Family] : lives with family [Employed] : employed [] :  [# Of Children ___] : has [unfilled] children [Feels Safe at Home] : Feels safe at home [Fully functional (bathing, dressing, toileting, transferring, walking, feeding)] : Fully functional (bathing, dressing, toileting, transferring, walking, feeding) [Fully functional (using the telephone, shopping, preparing meals, housekeeping, doing laundry, using] : Fully functional and needs no help or supervision to perform IADLs (using the telephone, shopping, preparing meals, housekeeping, doing laundry, using transportation, managing medications and managing finances) [Smoke Detector] : smoke detector [Carbon Monoxide Detector] : carbon monoxide detector [Safety elements used in home] : safety elements used in home [Seat Belt] :  uses seat belt [Sunscreen] : uses sunscreen [FreeTextEntry1] : none  [de-identified] : no  [de-identified] : cardio, urologist, GI, audiologist and opthalmology, podiatry  and Derm  [de-identified] : walks daily  [de-identified] : regular  [[id number]] : 0 [Change in mental status noted] : No change in mental status noted [Reports changes in hearing] : Reports no changes in hearing [Reports changes in vision] : Reports no changes in vision [Reports normal functional visual acuity (ie: able to read med bottle)] : Reports poor functional visual acuity.  [Reports changes in dental health] : Reports no changes in dental health [ColonoscopyDate] : 2015 [AdvancecareDate] : 2/1/2022

## 2022-02-01 NOTE — PHYSICAL EXAM
[No Acute Distress] : no acute distress [Normal Sclera/Conjunctiva] : normal sclera/conjunctiva [PERRL] : pupils equal round and reactive to light [EOMI] : extraocular movements intact [Normal Outer Ear/Nose] : the outer ears and nose were normal in appearance [No JVD] : no jugular venous distention [No Lymphadenopathy] : no lymphadenopathy [Supple] : supple [Thyroid Normal, No Nodules] : the thyroid was normal and there were no nodules present [No Respiratory Distress] : no respiratory distress  [No Accessory Muscle Use] : no accessory muscle use [Clear to Auscultation] : lungs were clear to auscultation bilaterally [Normal Rate] : normal rate  [Regular Rhythm] : with a regular rhythm [Normal S1, S2] : normal S1 and S2 [No Murmur] : no murmur heard [No Carotid Bruits] : no carotid bruits [No Edema] : there was no peripheral edema [No Extremity Clubbing/Cyanosis] : no extremity clubbing/cyanosis [Soft] : abdomen soft [Non Tender] : non-tender [Non-distended] : non-distended [No HSM] : no HSM [Normal Bowel Sounds] : normal bowel sounds [Normal Posterior Cervical Nodes] : no posterior cervical lymphadenopathy [Normal Anterior Cervical Nodes] : no anterior cervical lymphadenopathy [No Spinal Tenderness] : no spinal tenderness [Grossly Normal Strength/Tone] : grossly normal strength/tone [No Focal Deficits] : no focal deficits [Normal Gait] : normal gait [Normal Affect] : the affect was normal [Normal Insight/Judgement] : insight and judgment were intact [Kyphosis] : no kyphosis [de-identified] : bilateral impacted cerumen  [de-identified] : intact DP and TP bilaterally

## 2022-02-02 LAB — HBA1C MFR BLD HPLC: 6.4

## 2022-04-07 ENCOUNTER — APPOINTMENT (OUTPATIENT)
Dept: FAMILY MEDICINE | Facility: CLINIC | Age: 57
End: 2022-04-07
Payer: MEDICAID

## 2022-04-07 PROCEDURE — 99213 OFFICE O/P EST LOW 20 MIN: CPT | Mod: 95

## 2022-04-07 NOTE — HISTORY OF PRESENT ILLNESS
[Home] : at home, [unfilled] , at the time of the visit. [Medical Office: ([facility])___] : at the medical office located in  [Verbal consent obtained from patient] : the patient, [unfilled] [FreeTextEntry8] : Verbal consent was obtained from patient for telehealth on 4/7/22\par \par Admits allergies that started 2 days ago and developed into 1 x fever 102.8, productive cough with recent negative COVID home test. Has no relief with Tylenol, Claritin. Denies SOB and wheezing. \par

## 2022-04-07 NOTE — PHYSICAL EXAM
[No Acute Distress] : no acute distress [No Respiratory Distress] : no respiratory distress  [Normal Affect] : the affect was normal

## 2022-04-07 NOTE — PLAN
[FreeTextEntry1] : 57 yr old male bacterial pneumonia \par start Levofloxacin as directed \par Guaifenesin directed for cough \par May take Tylenol and or Motrin for relief \par if symptoms not improved with the above with COVID test \par continue all medications as directed \par RTO as routine for follow up.\par \par \par

## 2022-04-07 NOTE — REVIEW OF SYSTEMS
[Fever] : fever [Chills] : chills [Fatigue] : fatigue [Postnasal Drip] : postnasal drip [Nasal Discharge] : nasal discharge [Hoarseness] : hoarseness [Cough] : cough [Earache] : no earache [Hearing Loss] : no hearing loss [Nosebleeds] : no nosebleeds [Sore Throat] : no sore throat [Shortness Of Breath] : no shortness of breath [Wheezing] : no wheezing [Dyspnea on Exertion] : not dyspnea on exertion [Negative] : Heme/Lymph

## 2022-04-30 ENCOUNTER — RX RENEWAL (OUTPATIENT)
Age: 57
End: 2022-04-30

## 2022-05-03 ENCOUNTER — APPOINTMENT (OUTPATIENT)
Dept: FAMILY MEDICINE | Facility: CLINIC | Age: 57
End: 2022-05-03
Payer: MEDICAID

## 2022-05-03 VITALS
OXYGEN SATURATION: 97 % | DIASTOLIC BLOOD PRESSURE: 70 MMHG | BODY MASS INDEX: 27.2 KG/M2 | RESPIRATION RATE: 14 BRPM | HEART RATE: 73 BPM | SYSTOLIC BLOOD PRESSURE: 122 MMHG | HEIGHT: 70 IN | WEIGHT: 190 LBS

## 2022-05-03 VITALS — TEMPERATURE: 97.2 F

## 2022-05-03 LAB
AMPHETAMINE: NEGATIVE
BARBITUATES: NEGATIVE
BENZODIAZ UR-MCNC: NEGATIVE
BUPRENORPHINE: NEGATIVE
CANNABINOIDS SAL QL SCN: NEGATIVE
COCAINE METAB.OTHER UR-MCNC: NEGATIVE
ECSTASY (MDMA): NEGATIVE
METHADONE: NEGATIVE
METHAMPHETAMINE: NEGATIVE
OPIATES 300 (OPI 300/MOP): NEGATIVE
OPIATES: NORMAL
OXYCODONE UR-MCNC: NEGATIVE
PCP UR-MCNC: NEGATIVE
PROPOXYPHENE: NORMAL
THC UR QL: NORMAL
TRICYCLICS FLD-MCNC: NORMAL

## 2022-05-03 PROCEDURE — 80305 DRUG TEST PRSMV DIR OPT OBS: CPT | Mod: QW

## 2022-05-03 PROCEDURE — 99214 OFFICE O/P EST MOD 30 MIN: CPT | Mod: 25

## 2022-05-03 RX ORDER — GUAIFENESIN AND CODEINE PHOSPHATE 10; 100 MG/5ML; MG/5ML
100-10 SOLUTION ORAL
Qty: 120 | Refills: 0 | Status: DISCONTINUED | COMMUNITY
Start: 2022-04-07 | End: 2022-05-03

## 2022-05-03 RX ORDER — LEVOFLOXACIN 500 MG/1
500 TABLET, FILM COATED ORAL DAILY
Qty: 10 | Refills: 0 | Status: DISCONTINUED | COMMUNITY
Start: 2022-04-07 | End: 2022-05-03

## 2022-05-03 NOTE — HISTORY OF PRESENT ILLNESS
[FreeTextEntry1] : Pt following up on anxiety, needs renewal Alprazolam to local pharmacy. Feels well, no complaints at this time. [de-identified] : 57 yr old male follow up. Denies changes in vision, dizziness, chest pain, palpitations and lower extremity edema.\par

## 2022-05-03 NOTE — PLAN
[FreeTextEntry1] : 57 yr old male follow up. \par \par Refilled Xanax \par I-STOP:  #: [id number] \par Patient advised harmful side effects and risk of dependence with long term use of benzodiazepines. \par Advised caution related to sedative effects and possible respiratory depression. Advised not to take medication while driving  or in combination with alcohol.\par \par completed urine drug along with controlled substance agreement today \par A1c and recent blood work reviewed from prior \par

## 2022-05-03 NOTE — PHYSICAL EXAM
[No Acute Distress] : no acute distress [Normal Sclera/Conjunctiva] : normal sclera/conjunctiva [Normal Outer Ear/Nose] : the outer ears and nose were normal in appearance [No JVD] : no jugular venous distention [No Respiratory Distress] : no respiratory distress  [No Extremity Clubbing/Cyanosis] : no extremity clubbing/cyanosis [Normal Gait] : normal gait [Normal Affect] : the affect was normal [Normal Insight/Judgement] : insight and judgment were intact

## 2022-05-04 LAB
AMPHET UR-MCNC: NORMAL
AMPHETAMINES UR QL: NORMAL
AMPHETAMINES UR QL: NORMAL
BARBITURATES SERPL-MCNC: NORMAL
BARBITURATES SERPL-MCNC: NORMAL
BARBITURATES UR-MCNC: NORMAL
BENZODIAZ SERPL-MCNC: NORMAL
BENZODIAZ SERPL-MCNC: NORMAL
BENZODIAZ UR-MCNC: NORMAL
BZE UR QL: NORMAL
BZE UR QL: NORMAL
COCAINE METAB.OTHER UR-MCNC: NORMAL
CREATININE, URINE: NORMAL
DRUG SCREEN, URINE ROUTINE: NORMAL
METHADONE UR QL: NORMAL
METHADONE UR QL: NORMAL
METHADONE UR-MCNC: NORMAL
METHAQUALONE UR-MCNC: NORMAL
OPIATES UR QL: NORMAL
OPIATES UR QL: NORMAL
OPIATES UR-MCNC: NORMAL
PCP UR QL: NORMAL
PCP UR QL: NORMAL
PCP UR-MCNC: NORMAL
PROPOXYPH UR QL: NORMAL
THC UR QL: NORMAL

## 2022-09-12 ENCOUNTER — APPOINTMENT (OUTPATIENT)
Dept: FAMILY MEDICINE | Facility: CLINIC | Age: 57
End: 2022-09-12

## 2022-09-14 ENCOUNTER — APPOINTMENT (OUTPATIENT)
Dept: FAMILY MEDICINE | Facility: CLINIC | Age: 57
End: 2022-09-14

## 2022-09-14 VITALS
RESPIRATION RATE: 12 BRPM | WEIGHT: 195 LBS | HEART RATE: 81 BPM | DIASTOLIC BLOOD PRESSURE: 76 MMHG | BODY MASS INDEX: 27.92 KG/M2 | HEIGHT: 70 IN | SYSTOLIC BLOOD PRESSURE: 118 MMHG | OXYGEN SATURATION: 97 %

## 2022-09-14 VITALS — TEMPERATURE: 97.7 F

## 2022-09-14 DIAGNOSIS — Z13.220 ENCOUNTER FOR SCREENING FOR LIPOID DISORDERS: ICD-10-CM

## 2022-09-14 DIAGNOSIS — R22.1 LOCALIZED SWELLING, MASS AND LUMP, NECK: ICD-10-CM

## 2022-09-14 DIAGNOSIS — R05.9 COUGH, UNSPECIFIED: ICD-10-CM

## 2022-09-14 DIAGNOSIS — R79.9 ABNORMAL FINDING OF BLOOD CHEMISTRY, UNSPECIFIED: ICD-10-CM

## 2022-09-14 DIAGNOSIS — Z87.01 PERSONAL HISTORY OF PNEUMONIA (RECURRENT): ICD-10-CM

## 2022-09-14 DIAGNOSIS — Z87.2 PERSONAL HISTORY OF DISEASES OF THE SKIN AND SUBCUTANEOUS TISSUE: ICD-10-CM

## 2022-09-14 DIAGNOSIS — Z86.79 PERSONAL HISTORY OF OTHER DISEASES OF THE CIRCULATORY SYSTEM: ICD-10-CM

## 2022-09-14 DIAGNOSIS — Z13.29 ENCOUNTER FOR SCREENING FOR OTHER SUSPECTED ENDOCRINE DISORDER: ICD-10-CM

## 2022-09-14 DIAGNOSIS — Z23 ENCOUNTER FOR IMMUNIZATION: ICD-10-CM

## 2022-09-14 PROCEDURE — 99214 OFFICE O/P EST MOD 30 MIN: CPT

## 2022-09-14 NOTE — PLAN
[FreeTextEntry1] : continue all medications as directed \par healthy diet and physical activity as tolerated\par routine lab script given \par will request DM eye and podiatry examination \par declined statin and ACE/ARB at this time \par reviewed prior cardio note \par declined PVC and flu today \par RTO as routine for follow up.\par

## 2022-09-14 NOTE — COUNSELING
[Encouraged to increase physical activity] : Encouraged to increase physical activity
abdominal pain

## 2022-09-14 NOTE — HISTORY OF PRESENT ILLNESS
[FreeTextEntry1] : Patient presents for medication refills [de-identified] : 57 yr old male is here for follow up. He is taking his fingersticks with normal readings. Denies changes in vision, dizziness, chest pain, palpitations and lower extremity edema.\par

## 2022-09-16 ENCOUNTER — APPOINTMENT (OUTPATIENT)
Dept: FAMILY MEDICINE | Facility: CLINIC | Age: 57
End: 2022-09-16

## 2022-09-16 VITALS
WEIGHT: 198 LBS | TEMPERATURE: 97 F | HEIGHT: 70 IN | DIASTOLIC BLOOD PRESSURE: 72 MMHG | BODY MASS INDEX: 28.35 KG/M2 | RESPIRATION RATE: 14 BRPM | HEART RATE: 88 BPM | SYSTOLIC BLOOD PRESSURE: 122 MMHG | OXYGEN SATURATION: 98 %

## 2022-09-16 PROCEDURE — 99214 OFFICE O/P EST MOD 30 MIN: CPT | Mod: 25

## 2022-09-16 PROCEDURE — 36415 COLL VENOUS BLD VENIPUNCTURE: CPT

## 2022-09-16 NOTE — PHYSICAL EXAM
[No Lymphadenopathy] : no lymphadenopathy [Supple] : supple [No Focal Deficits] : no focal deficits [Normal Affect] : the affect was normal [Normal Mood] : the mood was normal [Normal Insight/Judgement] : insight and judgment were intact [Normal] : normal rate, regular rhythm, normal S1 and S2 and no murmur heard [No Edema] : there was no peripheral edema [de-identified] : no calf tenderness b/l LE [de-identified] : +erythematous area on right lower back insertion site noted, no ring around rash  [de-identified] : CN 2-12 INTACT, NORMAL STRENGTH UPPER AND LOWER EXT B/L 5/5, NORMAL RAPID ALTERNATING MOVEMENTS AND FINGER TO NOSE

## 2022-09-16 NOTE — HISTORY OF PRESENT ILLNESS
[de-identified] : 58yo M presents for possible bullseye rash\par \par \par denies fevers, chills, joint pain, rash\par  [FreeTextEntry8] : 56yo M presents for possible bullseye rash yesterday \par \par +bullseye rash, +chills and malaise, headache at times \par \par denies fevers\par \par feeling shakiness and jittery and headache and word finding difficulty \par he said he is usually affected cognitively when he gets lymes and usually has word finding difficulty \par he had lymes 2x in the past\par \par +joint pain since yesterday ,feels better with walking  3 /10  \par he has never had joint pain like this in the past \par \par he said he had to see infectious disease in the past and they found it under a microscope\par first lymes was 20yrs ago, this time 7-10yrs ago\par he didn’t see the tick bite\par \par he said he feels the same way when he had lymes in the past\par \par denies worst headache of lfe\par

## 2022-09-16 NOTE — PLAN
[FreeTextEntry1] : \par lymes? \par check tick panel today and then in 4-6 weeks\par start doxycyline 100mg po bid x 21 days\par advised do not stay in the sun while on antibiotic and advised to wear sunscreen\par probiotic po daily\par advised if neurological deficits to go to ED for ct head \par labs drawn in office and will be sent to [facility] core lab\par \par f/u 4-5 weeks pt agreed w/plan.

## 2022-09-16 NOTE — PHYSICAL EXAM
[No Lymphadenopathy] : no lymphadenopathy [Supple] : supple [No Focal Deficits] : no focal deficits [Normal Affect] : the affect was normal [Normal Mood] : the mood was normal [Normal Insight/Judgement] : insight and judgment were intact [Normal] : normal rate, regular rhythm, normal S1 and S2 and no murmur heard [No Edema] : there was no peripheral edema [de-identified] : no calf tenderness b/l LE [de-identified] : +erythematous area on right lower back insertion site noted, no ring around rash  [de-identified] : CN 2-12 INTACT, NORMAL STRENGTH UPPER AND LOWER EXT B/L 5/5, NORMAL RAPID ALTERNATING MOVEMENTS AND FINGER TO NOSE

## 2022-09-16 NOTE — PLAN
[FreeTextEntry1] : \par lymes? \par check tick panel today and then in 4-6 weeks\par start doxycyline 100mg po bid x 21 days\par advised do not stay in the sun while on antibiotic and advised to wear sunscreen\par probiotic po daily\par advised if neurological deficits to go to ED for ct head \par labs drawn in office and will be sent to [facility] core lab\par \par f/u 4-5 weeks pt agreed w/plan.

## 2022-09-16 NOTE — HISTORY OF PRESENT ILLNESS
[de-identified] : 58yo M presents for possible bullseye rash\par \par \par denies fevers, chills, joint pain, rash\par  [FreeTextEntry8] : 56yo M presents for possible bullseye rash yesterday \par \par +bullseye rash, +chills and malaise, headache at times \par \par denies fevers\par \par feeling shakiness and jittery and headache and word finding difficulty \par he said he is usually affected cognitively when he gets lymes and usually has word finding difficulty \par he had lymes 2x in the past\par \par +joint pain since yesterday ,feels better with walking  3 /10  \par he has never had joint pain like this in the past \par \par he said he had to see infectious disease in the past and they found it under a microscope\par first lymes was 20yrs ago, this time 7-10yrs ago\par he didn’t see the tick bite\par \par he said he feels the same way when he had lymes in the past\par \par denies worst headache of lfe\par

## 2022-09-27 ENCOUNTER — TRANSCRIPTION ENCOUNTER (OUTPATIENT)
Age: 57
End: 2022-09-27

## 2022-10-04 ENCOUNTER — RX RENEWAL (OUTPATIENT)
Age: 57
End: 2022-10-04

## 2022-10-11 ENCOUNTER — APPOINTMENT (OUTPATIENT)
Dept: INFECTIOUS DISEASE | Facility: CLINIC | Age: 57
End: 2022-10-11

## 2022-11-10 ENCOUNTER — RX RENEWAL (OUTPATIENT)
Age: 57
End: 2022-11-10

## 2022-11-10 DIAGNOSIS — R21 RASH AND OTHER NONSPECIFIC SKIN ERUPTION: ICD-10-CM

## 2022-11-21 ENCOUNTER — RX RENEWAL (OUTPATIENT)
Age: 57
End: 2022-11-21

## 2022-12-16 ENCOUNTER — APPOINTMENT (OUTPATIENT)
Dept: FAMILY MEDICINE | Facility: CLINIC | Age: 57
End: 2022-12-16

## 2023-01-16 ENCOUNTER — APPOINTMENT (OUTPATIENT)
Dept: FAMILY MEDICINE | Facility: CLINIC | Age: 58
End: 2023-01-16
Payer: MEDICAID

## 2023-01-16 VITALS
SYSTOLIC BLOOD PRESSURE: 122 MMHG | BODY MASS INDEX: 28.06 KG/M2 | OXYGEN SATURATION: 96 % | DIASTOLIC BLOOD PRESSURE: 72 MMHG | HEART RATE: 84 BPM | RESPIRATION RATE: 14 BRPM | HEIGHT: 70 IN | WEIGHT: 196 LBS

## 2023-01-16 VITALS — TEMPERATURE: 97 F

## 2023-01-16 PROCEDURE — 99214 OFFICE O/P EST MOD 30 MIN: CPT

## 2023-01-16 NOTE — PLAN
[FreeTextEntry1] : continue all medications as directed \par healthy diet and physical activity as tolerated\par given referral to podiatry due to left foot pain and routine DM foot examination \par activity as tolerated \par given script for A1c and CMP \par I-STOP:  #: [id number]\par Xanax refilled \par RTO as routine for follow up.\par

## 2023-01-16 NOTE — HISTORY OF PRESENT ILLNESS
[FreeTextEntry1] : Patient presents for a medication renewal [de-identified] : 57 yr old male is here for follow up. He has left foot pain and is concerned with plantar fasciitis. He is taking all medications as directed. Denies changes in vision, dizziness, chest pain, palpitations and lower extremity edema.\par

## 2023-01-16 NOTE — REVIEW OF SYSTEMS
[Joint Pain] : joint pain [Negative] : Heme/Lymph [Joint Stiffness] : no joint stiffness [Muscle Pain] : no muscle pain [Muscle Weakness] : no muscle weakness [Back Pain] : no back pain [Joint Swelling] : no joint swelling [FreeTextEntry9] : left foot pain

## 2023-01-16 NOTE — PHYSICAL EXAM
[No Acute Distress] : no acute distress [Normal Sclera/Conjunctiva] : normal sclera/conjunctiva [Normal Outer Ear/Nose] : the outer ears and nose were normal in appearance [No JVD] : no jugular venous distention [No Respiratory Distress] : no respiratory distress  [No Accessory Muscle Use] : no accessory muscle use [Clear to Auscultation] : lungs were clear to auscultation bilaterally [Normal Rate] : normal rate  [Regular Rhythm] : with a regular rhythm [Normal S1, S2] : normal S1 and S2 [No Murmur] : no murmur heard [Pedal Pulses Present] : the pedal pulses are present [No Extremity Clubbing/Cyanosis] : no extremity clubbing/cyanosis [Grossly Normal Strength/Tone] : grossly normal strength/tone [Normal Gait] : normal gait [Normal Affect] : the affect was normal

## 2023-01-18 LAB — HBA1C MFR BLD HPLC: 7.1

## 2023-03-23 ENCOUNTER — APPOINTMENT (OUTPATIENT)
Dept: FAMILY MEDICINE | Facility: CLINIC | Age: 58
End: 2023-03-23
Payer: MEDICAID

## 2023-03-23 VITALS
HEIGHT: 70 IN | TEMPERATURE: 96 F | DIASTOLIC BLOOD PRESSURE: 84 MMHG | WEIGHT: 196.8 LBS | OXYGEN SATURATION: 98 % | BODY MASS INDEX: 28.18 KG/M2 | RESPIRATION RATE: 14 BRPM | SYSTOLIC BLOOD PRESSURE: 126 MMHG | HEART RATE: 75 BPM

## 2023-03-23 VITALS — DIASTOLIC BLOOD PRESSURE: 86 MMHG | SYSTOLIC BLOOD PRESSURE: 124 MMHG

## 2023-03-23 PROCEDURE — 99214 OFFICE O/P EST MOD 30 MIN: CPT

## 2023-03-23 RX ORDER — DOXYCYCLINE HYCLATE 100 MG/1
100 TABLET ORAL
Qty: 42 | Refills: 0 | Status: DISCONTINUED | COMMUNITY
Start: 2022-09-16 | End: 2023-03-23

## 2023-03-23 NOTE — PLAN
[FreeTextEntry1] : 57 yr old male follow up \par continue all medications as directed \par healthy diet and physical activity as tolerated\par I-STOP today \par given script for labs \par RTO as routine for follow up.\par \par

## 2023-03-23 NOTE — HISTORY OF PRESENT ILLNESS
[FreeTextEntry1] : pt presents for medication renewal for Alprazolam  [de-identified] : 57 yr old male follow up. He is seeing podiatry due to planar fascitis with improvement. He is taking all medications without issues. Denies changes in vision, dizziness, chest pain, palpitations and lower extremity edema.\par

## 2023-03-24 ENCOUNTER — RX RENEWAL (OUTPATIENT)
Age: 58
End: 2023-03-24

## 2023-05-29 ENCOUNTER — RX RENEWAL (OUTPATIENT)
Age: 58
End: 2023-05-29

## 2023-06-20 ENCOUNTER — APPOINTMENT (OUTPATIENT)
Dept: FAMILY MEDICINE | Facility: CLINIC | Age: 58
End: 2023-06-20
Payer: MEDICAID

## 2023-06-20 VITALS
TEMPERATURE: 97.9 F | HEART RATE: 70 BPM | DIASTOLIC BLOOD PRESSURE: 72 MMHG | OXYGEN SATURATION: 98 % | BODY MASS INDEX: 28.2 KG/M2 | WEIGHT: 197 LBS | SYSTOLIC BLOOD PRESSURE: 118 MMHG | HEIGHT: 70 IN | RESPIRATION RATE: 14 BRPM

## 2023-06-20 LAB — HBA1C MFR BLD HPLC: 7.2

## 2023-06-20 PROCEDURE — 99213 OFFICE O/P EST LOW 20 MIN: CPT | Mod: 25

## 2023-06-20 PROCEDURE — 83036 HEMOGLOBIN GLYCOSYLATED A1C: CPT | Mod: QW

## 2023-06-20 NOTE — HISTORY OF PRESENT ILLNESS
[FreeTextEntry1] : patient presents for 3 month med check  [de-identified] : 58 yr old male is here for follow up. He has chronic left foot plantar fascitis. Denies changes in vision, dizziness, chest pain, palpitations and lower extremity edema.\par

## 2023-06-20 NOTE — REVIEW OF SYSTEMS
[Joint Pain] : no joint pain [Joint Stiffness] : no joint stiffness [Muscle Pain] : muscle pain [Back Pain] : no back pain [Joint Swelling] : no joint swelling [Negative] : Heme/Lymph

## 2023-06-20 NOTE — PLAN
[FreeTextEntry1] : 58 yr old male follow up. \par \par DM type II: \par A1c 7.2 in office \par reduce carbs \par A1c goal <7 \par DM eye and foot examination up to date \par reviewed prior cardio stress test with normal results \par will need fasting labs next visit \par \par Xanax refilled, I-STOP\par RTO as routine for follow up.

## 2023-06-27 ENCOUNTER — RX RENEWAL (OUTPATIENT)
Age: 58
End: 2023-06-27

## 2023-07-21 ENCOUNTER — APPOINTMENT (OUTPATIENT)
Dept: FAMILY MEDICINE | Facility: CLINIC | Age: 58
End: 2023-07-21
Payer: MEDICAID

## 2023-07-21 VITALS
BODY MASS INDEX: 28.2 KG/M2 | HEART RATE: 76 BPM | SYSTOLIC BLOOD PRESSURE: 138 MMHG | HEIGHT: 70 IN | RESPIRATION RATE: 16 BRPM | DIASTOLIC BLOOD PRESSURE: 78 MMHG | OXYGEN SATURATION: 99 % | TEMPERATURE: 98.4 F | WEIGHT: 197 LBS

## 2023-07-21 VITALS — SYSTOLIC BLOOD PRESSURE: 120 MMHG | DIASTOLIC BLOOD PRESSURE: 74 MMHG

## 2023-07-21 DIAGNOSIS — R47.89 OTHER SPEECH DISTURBANCES: ICD-10-CM

## 2023-07-21 DIAGNOSIS — R41.89 OTHER SYMPTOMS AND SIGNS INVOLVING COGNITIVE FUNCTIONS AND AWARENESS: ICD-10-CM

## 2023-07-21 DIAGNOSIS — A69.20 LYME DISEASE, UNSPECIFIED: ICD-10-CM

## 2023-07-21 PROCEDURE — 99214 OFFICE O/P EST MOD 30 MIN: CPT

## 2023-07-21 NOTE — PHYSICAL EXAM
[No Edema] : there was no peripheral edema [Normal] : no rash [Normal Affect] : the affect was normal [Alert and Oriented x3] : oriented to person, place, and time [Normal Insight/Judgement] : insight and judgment were intact

## 2023-07-24 NOTE — HISTORY OF PRESENT ILLNESS
[FreeTextEntry1] : Patient is present. Patient is here for bloodwork. [FreeTextEntry8] : YESICA PAREDES is a 58 year male who presents today Jul 21, 2023 for brain fog, fatigue, and difficulty finding words x 2 day. \par \par Yesterday, 7/20 he went to [place] [facility] with confusion, brain fog, and difficulty finding words. A CODE stroke was called. All labs and CT were normal. Patient was discharged home. \par \par He has history of lyme 20 years ago. Then 7-10 years ago. This past Sept 2022 he had another tick bite with a bull eye rash, joint paint, chill and malaise. He was treated with 21 day supply of doxy. \par \par He has similar symptoms from his previous lyme infection such as brain fog and difficulty finding words, feeling under the weather, and fatigue. \par \par He will be seeing an infectious disease specialist, but needs lyme testing first. \par \par No known tick bites recently. \par He walks on the bach everyday.\par He did get bite by a bug 2 weeks ago.\par \par Patient denies any lightheadedness, dizziness, chest pain, palpitations, shortness or breath, or lower leg edema.

## 2023-07-24 NOTE — ASSESSMENT
[FreeTextEntry1] : YESICA PAREDES is a 58 year male who presents today Jul 21, 2023 for brain fog and difficulty finding words, feeling under the weather, and fatigue x 2 days\par \par Blood work and imaging reviewed from [facility]\par Will order tick borne disease antibodies\par Will treat with doxy 100mg BID for 28 days\par Continue follow up with ID specialists\par \par If symptoms worsen or do not improve in 7-10 days then return to office. \par

## 2023-07-24 NOTE — HISTORY OF PRESENT ILLNESS
[FreeTextEntry1] : Patient is present. Patient is here for bloodwork. [FreeTextEntry8] : YESICA PAREDES is a 58 year male who presents today Jul 21, 2023 for brain fog, fatigue, and difficulty finding words x 2 day. \par \par Yesterday, 7/20 he went to [place] ER with confusion, brain fog, and difficulty finding words. A CODE stroke was called. All labs and CT were normal. Patient was discharged home. \par \par He has history of lyme 20 years ago. Then 7-10 years ago. This past Sept 2022 he had another tick bite with a bull eye rash, joint paint, chill and malaise. He was treated with 21 day supply of doxy. \par \par He has similar symptoms from his previous lyme infection such as brain fog and difficulty finding words, feeling under the weather, and fatigue. \par \par He will be seeing an infectious disease specialist, but needs lyme testing first. \par \par No known tick bites recently. \par He walks on the bach everyday.\par He did get bite by a bug 2 weeks ago.\par \par Patient denies any lightheadedness, dizziness, chest pain, palpitations, shortness or breath, or lower leg edema.

## 2023-09-21 ENCOUNTER — APPOINTMENT (OUTPATIENT)
Dept: FAMILY MEDICINE | Facility: CLINIC | Age: 58
End: 2023-09-21
Payer: MEDICAID

## 2023-09-21 VITALS
SYSTOLIC BLOOD PRESSURE: 124 MMHG | BODY MASS INDEX: 28.2 KG/M2 | WEIGHT: 197 LBS | RESPIRATION RATE: 16 BRPM | HEIGHT: 70 IN | TEMPERATURE: 99.1 F | HEART RATE: 96 BPM | DIASTOLIC BLOOD PRESSURE: 88 MMHG | OXYGEN SATURATION: 99 %

## 2023-09-21 DIAGNOSIS — M72.2 PLANTAR FASCIAL FIBROMATOSIS: ICD-10-CM

## 2023-09-21 PROCEDURE — 99213 OFFICE O/P EST LOW 20 MIN: CPT

## 2023-10-06 ENCOUNTER — RX RENEWAL (OUTPATIENT)
Age: 58
End: 2023-10-06

## 2023-10-26 ENCOUNTER — APPOINTMENT (OUTPATIENT)
Dept: FAMILY MEDICINE | Facility: CLINIC | Age: 58
End: 2023-10-26
Payer: MEDICAID

## 2023-10-26 ENCOUNTER — NON-APPOINTMENT (OUTPATIENT)
Age: 58
End: 2023-10-26

## 2023-10-26 VITALS
OXYGEN SATURATION: 96 % | HEART RATE: 71 BPM | TEMPERATURE: 98.3 F | SYSTOLIC BLOOD PRESSURE: 120 MMHG | HEIGHT: 70 IN | DIASTOLIC BLOOD PRESSURE: 78 MMHG | WEIGHT: 195 LBS | RESPIRATION RATE: 16 BRPM | BODY MASS INDEX: 27.92 KG/M2

## 2023-10-26 DIAGNOSIS — Z00.00 ENCOUNTER FOR GENERAL ADULT MEDICAL EXAMINATION W/OUT ABNORMAL FINDINGS: ICD-10-CM

## 2023-10-26 PROCEDURE — 99396 PREV VISIT EST AGE 40-64: CPT | Mod: 25

## 2023-10-30 LAB — HBA1C MFR BLD HPLC: 7.5

## 2024-01-15 ENCOUNTER — APPOINTMENT (OUTPATIENT)
Dept: FAMILY MEDICINE | Facility: CLINIC | Age: 59
End: 2024-01-15
Payer: MEDICAID

## 2024-01-15 VITALS
HEIGHT: 70 IN | DIASTOLIC BLOOD PRESSURE: 80 MMHG | HEART RATE: 69 BPM | TEMPERATURE: 97.9 F | WEIGHT: 200 LBS | OXYGEN SATURATION: 97 % | SYSTOLIC BLOOD PRESSURE: 116 MMHG | BODY MASS INDEX: 28.63 KG/M2 | RESPIRATION RATE: 15 BRPM

## 2024-01-15 DIAGNOSIS — R53.83 OTHER FATIGUE: ICD-10-CM

## 2024-01-15 LAB — HBA1C MFR BLD HPLC: 7

## 2024-01-15 PROCEDURE — 83036 HEMOGLOBIN GLYCOSYLATED A1C: CPT | Mod: QW

## 2024-01-15 PROCEDURE — 99213 OFFICE O/P EST LOW 20 MIN: CPT | Mod: 25

## 2024-01-15 RX ORDER — CLOTRIMAZOLE AND BETAMETHASONE DIPROPIONATE 10; .5 MG/G; MG/G
1-0.05 CREAM TOPICAL TWICE DAILY
Qty: 45 | Refills: 0 | Status: DISCONTINUED | COMMUNITY
Start: 2020-03-13 | End: 2024-01-15

## 2024-01-15 RX ORDER — DOXYCYCLINE HYCLATE 100 MG/1
100 TABLET ORAL TWICE DAILY
Qty: 56 | Refills: 0 | Status: DISCONTINUED | COMMUNITY
Start: 2023-07-21 | End: 2024-01-15

## 2024-01-15 NOTE — PLAN
[FreeTextEntry1] : 58M follow up   DM is well controlled with current medical regimen at this time  A1c POCT 7  A1c goal <7  goal LDL <70  healthy diet, weight loss and physical activity has been encouraged  Anxiety:  xanax refilled  I-STOP   reviewed prior labs

## 2024-01-15 NOTE — HISTORY OF PRESENT ILLNESS
[FreeTextEntry1] : Patient presents for medication renewals  [de-identified] : 58yr old male is here for medication refill. He has no acute medical concerns.

## 2024-01-15 NOTE — PHYSICAL EXAM
[No Acute Distress] : no acute distress [Normal Sclera/Conjunctiva] : normal sclera/conjunctiva [Normal Outer Ear/Nose] : the outer ears and nose were normal in appearance [No JVD] : no jugular venous distention [No Respiratory Distress] : no respiratory distress  [No Accessory Muscle Use] : no accessory muscle use [Normal Rate] : normal rate  [Alert and Oriented x3] : oriented to person, place, and time

## 2024-01-25 ENCOUNTER — RX RENEWAL (OUTPATIENT)
Age: 59
End: 2024-01-25

## 2024-03-15 ENCOUNTER — APPOINTMENT (OUTPATIENT)
Dept: FAMILY MEDICINE | Facility: CLINIC | Age: 59
End: 2024-03-15

## 2024-04-01 ENCOUNTER — APPOINTMENT (OUTPATIENT)
Dept: FAMILY MEDICINE | Facility: CLINIC | Age: 59
End: 2024-04-01
Payer: MEDICAID

## 2024-04-01 ENCOUNTER — APPOINTMENT (OUTPATIENT)
Dept: FAMILY MEDICINE | Facility: CLINIC | Age: 59
End: 2024-04-01

## 2024-04-01 VITALS
HEIGHT: 70 IN | HEART RATE: 77 BPM | TEMPERATURE: 98.1 F | BODY MASS INDEX: 28.63 KG/M2 | OXYGEN SATURATION: 97 % | RESPIRATION RATE: 15 BRPM | SYSTOLIC BLOOD PRESSURE: 124 MMHG | DIASTOLIC BLOOD PRESSURE: 72 MMHG | WEIGHT: 200 LBS

## 2024-04-01 DIAGNOSIS — E11.9 TYPE 2 DIABETES MELLITUS W/OUT COMPLICATIONS: ICD-10-CM

## 2024-04-01 DIAGNOSIS — E11.49 TYPE 2 DIABETES MELLITUS WITH OTHER DIABETIC NEUROLOGICAL COMPLICATION: ICD-10-CM

## 2024-04-01 PROCEDURE — 99441: CPT

## 2024-04-01 NOTE — PLAN
[FreeTextEntry1] : 59M medication refill for Xanax  I-STOP  continue all medications as directed  RTO as routine for follow up.

## 2024-04-01 NOTE — HISTORY OF PRESENT ILLNESS
[Home] : at home, [unfilled] , at the time of the visit. [Medical Office: ([facility])___] : at the medical office located in  [Verbal consent obtained from patient] : the patient, [unfilled] [FreeTextEntry1] : patient presents for medication renewal  [de-identified] : Verbal consent was obtained from patient for telephonic visit on 4/1/24  States he is well, no acute medical concerns.

## 2024-04-14 ENCOUNTER — RX RENEWAL (OUTPATIENT)
Age: 59
End: 2024-04-14

## 2024-04-14 RX ORDER — BLOOD SUGAR DIAGNOSTIC
STRIP MISCELLANEOUS
Qty: 100 | Refills: 5 | Status: ACTIVE | COMMUNITY
Start: 2021-04-06 | End: 1900-01-01

## 2024-05-06 ENCOUNTER — RX RENEWAL (OUTPATIENT)
Age: 59
End: 2024-05-06

## 2024-05-06 RX ORDER — METFORMIN HYDROCHLORIDE 500 MG/1
500 TABLET, COATED ORAL
Qty: 60 | Refills: 2 | Status: ACTIVE | COMMUNITY
Start: 2018-12-31 | End: 1900-01-01

## 2024-05-29 ENCOUNTER — APPOINTMENT (OUTPATIENT)
Dept: FAMILY MEDICINE | Facility: CLINIC | Age: 59
End: 2024-05-29
Payer: MEDICAID

## 2024-05-29 VITALS
HEART RATE: 70 BPM | RESPIRATION RATE: 14 BRPM | SYSTOLIC BLOOD PRESSURE: 110 MMHG | BODY MASS INDEX: 28.35 KG/M2 | WEIGHT: 198 LBS | TEMPERATURE: 98.7 F | HEIGHT: 70 IN | OXYGEN SATURATION: 96 % | DIASTOLIC BLOOD PRESSURE: 78 MMHG

## 2024-05-29 DIAGNOSIS — F41.9 ANXIETY DISORDER, UNSPECIFIED: ICD-10-CM

## 2024-05-29 DIAGNOSIS — E11.65 TYPE 2 DIABETES MELLITUS WITH HYPERGLYCEMIA: ICD-10-CM

## 2024-05-29 PROCEDURE — G2211 COMPLEX E/M VISIT ADD ON: CPT | Mod: NC,1L

## 2024-05-29 PROCEDURE — 99213 OFFICE O/P EST LOW 20 MIN: CPT

## 2024-05-29 RX ORDER — ALPRAZOLAM 0.25 MG/1
0.25 TABLET ORAL
Qty: 60 | Refills: 0 | Status: ACTIVE | COMMUNITY
Start: 2019-02-15 | End: 1900-01-01

## 2024-05-29 NOTE — PHYSICAL EXAM
[Normal Sclera/Conjunctiva] : normal sclera/conjunctiva [Normal Outer Ear/Nose] : the outer ears and nose were normal in appearance [No JVD] : no jugular venous distention [No Respiratory Distress] : no respiratory distress  [Normal Gait] : normal gait [Alert and Oriented x3] : oriented to person, place, and time

## 2024-05-29 NOTE — PLAN
[FreeTextEntry1] : I-STOP  Xanax refilled  reviewed prior A1c  routine labs 1 month  continue all medications as directed  RTO as routine for follow up.

## 2024-05-29 NOTE — HISTORY OF PRESENT ILLNESS
[FreeTextEntry1] : Patient presents for Xanax renewal  [de-identified] : 59M is here for medication refills. No acute medical concerns.

## 2024-07-09 ENCOUNTER — APPOINTMENT (OUTPATIENT)
Dept: FAMILY MEDICINE | Facility: CLINIC | Age: 59
End: 2024-07-09
Payer: MEDICAID

## 2024-07-09 VITALS
DIASTOLIC BLOOD PRESSURE: 72 MMHG | WEIGHT: 195 LBS | RESPIRATION RATE: 14 BRPM | HEART RATE: 79 BPM | OXYGEN SATURATION: 97 % | HEIGHT: 70 IN | TEMPERATURE: 97.9 F | SYSTOLIC BLOOD PRESSURE: 122 MMHG | BODY MASS INDEX: 27.92 KG/M2

## 2024-07-09 DIAGNOSIS — F41.9 ANXIETY DISORDER, UNSPECIFIED: ICD-10-CM

## 2024-07-09 DIAGNOSIS — E11.9 TYPE 2 DIABETES MELLITUS W/OUT COMPLICATIONS: ICD-10-CM

## 2024-07-09 PROCEDURE — 99213 OFFICE O/P EST LOW 20 MIN: CPT

## 2024-07-09 PROCEDURE — G2211 COMPLEX E/M VISIT ADD ON: CPT | Mod: NC,1L

## 2024-07-10 LAB
ALBUMIN SERPL ELPH-MCNC: 4.7 G/DL
ALP BLD-CCNC: 71 U/L
ALT SERPL-CCNC: 33 U/L
ANION GAP SERPL CALC-SCNC: 13 MMOL/L
AST SERPL-CCNC: 24 U/L
BASOPHILS NFR BLD AUTO: 1.3 %
BILIRUB SERPL-MCNC: 0.7 MG/DL
BUN SERPL-MCNC: 18 MG/DL
CHLORIDE SERPL-SCNC: 100 MMOL/L
CHOLEST SERPL-MCNC: 166 MG/DL
CO2 SERPL-SCNC: 25 MMOL/L
CREAT SERPL-MCNC: 0.88 MG/DL
EGFR: 98 ML/MIN/1.73M2
EOSINOPHIL # BLD AUTO: 0.17 K/UL
EOSINOPHIL NFR BLD AUTO: 3.6 %
ESTIMATED AVERAGE GLUCOSE: 154 MG/DL
GLUCOSE SERPL-MCNC: 191 MG/DL
HBA1C MFR BLD HPLC: 7 %
HCT VFR BLD CALC: 43.5 %
HDLC SERPL-MCNC: 35 MG/DL
HGB BLD-MCNC: 15.2 G/DL
IMM GRANULOCYTES NFR BLD AUTO: 0.4 %
LDLC SERPL CALC-MCNC: 94 MG/DL
LYMPHOCYTES # BLD AUTO: 1.56 K/UL
LYMPHOCYTES NFR BLD AUTO: 33.1 %
MAN DIFF?: NORMAL
MCHC RBC-ENTMCNC: 29.9 PG
MCHC RBC-ENTMCNC: 34.9 GM/DL
MONOCYTES # BLD AUTO: 0.36 K/UL
MONOCYTES NFR BLD AUTO: 7.6 %
NEUTROPHILS # BLD AUTO: 2.55 K/UL
NEUTROPHILS NFR BLD AUTO: 54 %
NONHDLC SERPL-MCNC: 131 MG/DL
PLATELET # BLD AUTO: 189 K/UL
POTASSIUM SERPL-SCNC: 4.2 MMOL/L
PROT SERPL-MCNC: 6.5 G/DL
PSA FREE FLD-MCNC: 29 %
PSA FREE SERPL-MCNC: 0.22 NG/ML
PSA SERPL-MCNC: 0.78 NG/ML
RBC # BLD: 5.09 M/UL
RBC # FLD: 12.6 %
SODIUM SERPL-SCNC: 138 MMOL/L
TRIGL SERPL-MCNC: 215 MG/DL
TSH SERPL-ACNC: 2.37 UIU/ML
WBC # FLD AUTO: 4.72 K/UL

## 2024-08-05 ENCOUNTER — RX RENEWAL (OUTPATIENT)
Age: 59
End: 2024-08-05

## 2024-08-17 ENCOUNTER — APPOINTMENT (OUTPATIENT)
Dept: FAMILY MEDICINE | Facility: CLINIC | Age: 59
End: 2024-08-17

## 2024-08-17 ENCOUNTER — NON-APPOINTMENT (OUTPATIENT)
Age: 59
End: 2024-08-17

## 2024-08-17 DIAGNOSIS — E11.65 TYPE 2 DIABETES MELLITUS WITH HYPERGLYCEMIA: ICD-10-CM

## 2024-08-17 DIAGNOSIS — F41.9 ANXIETY DISORDER, UNSPECIFIED: ICD-10-CM

## 2024-08-17 NOTE — HISTORY OF PRESENT ILLNESS
[Home] : at home, [unfilled] , at the time of the visit. [Medical Office: ([facility])___] : at the medical office located in  [Verbal consent obtained from patient] : the patient, [unfilled] tachycardic/sinus

## 2024-08-20 NOTE — CURRENT MEDS
Oriented to person, place and time. No gross deficit noted. [Takes medication as prescribed] : takes [None] : Patient does not have any barriers to medication adherence [Yes] : Reviewed medication list for presence of high-risk medications. [Benzodiazepines] : benzodiazepines

## 2024-09-13 ENCOUNTER — APPOINTMENT (OUTPATIENT)
Dept: FAMILY MEDICINE | Facility: CLINIC | Age: 59
End: 2024-09-13
Payer: MEDICAID

## 2024-09-13 DIAGNOSIS — M50.20 OTHER CERVICAL DISC DISPLACEMENT, UNSPECIFIED CERVICAL REGION: ICD-10-CM

## 2024-09-13 DIAGNOSIS — E11.9 TYPE 2 DIABETES MELLITUS W/OUT COMPLICATIONS: ICD-10-CM

## 2024-09-13 DIAGNOSIS — M54.2 CERVICALGIA: ICD-10-CM

## 2024-09-13 PROCEDURE — 99441: CPT

## 2024-09-13 NOTE — REVIEW OF SYSTEMS
[Joint Pain] : joint pain [Joint Stiffness] : no joint stiffness [Back Pain] : no back pain [Joint Swelling] : no joint swelling [Negative] : Heme/Lymph

## 2024-09-13 NOTE — HISTORY OF PRESENT ILLNESS
[Home] : at home, [unfilled] , at the time of the visit. [Medical Office: ([facility])___] : at the medical office located in  [Verbal consent obtained from patient] : the patient, [unfilled] [FreeTextEntry1] : medication refill  [de-identified] : Verbal consent was obtained from patient for telephonic visit on 9/13/24  States he has neck pain that he believes is from herniated disc and would like PT script.

## 2024-11-08 ENCOUNTER — APPOINTMENT (OUTPATIENT)
Dept: FAMILY MEDICINE | Facility: CLINIC | Age: 59
End: 2024-11-08
Payer: MEDICAID

## 2024-11-08 DIAGNOSIS — M54.2 CERVICALGIA: ICD-10-CM

## 2024-11-08 DIAGNOSIS — F41.9 ANXIETY DISORDER, UNSPECIFIED: ICD-10-CM

## 2024-11-08 DIAGNOSIS — E11.9 TYPE 2 DIABETES MELLITUS W/OUT COMPLICATIONS: ICD-10-CM

## 2024-11-08 PROCEDURE — 99212 OFFICE O/P EST SF 10 MIN: CPT

## 2024-11-20 ENCOUNTER — RX RENEWAL (OUTPATIENT)
Age: 59
End: 2024-11-20

## 2025-01-09 ENCOUNTER — APPOINTMENT (OUTPATIENT)
Dept: FAMILY MEDICINE | Facility: CLINIC | Age: 60
End: 2025-01-09

## 2025-01-09 DIAGNOSIS — F41.9 ANXIETY DISORDER, UNSPECIFIED: ICD-10-CM

## 2025-01-09 PROCEDURE — 99212 OFFICE O/P EST SF 10 MIN: CPT | Mod: 93

## 2025-03-01 ENCOUNTER — RX RENEWAL (OUTPATIENT)
Age: 60
End: 2025-03-01

## 2025-03-06 ENCOUNTER — NON-APPOINTMENT (OUTPATIENT)
Age: 60
End: 2025-03-06

## 2025-03-06 ENCOUNTER — APPOINTMENT (OUTPATIENT)
Dept: FAMILY MEDICINE | Facility: CLINIC | Age: 60
End: 2025-03-06
Payer: MEDICAID

## 2025-03-06 VITALS
BODY MASS INDEX: 27.63 KG/M2 | HEIGHT: 70 IN | SYSTOLIC BLOOD PRESSURE: 120 MMHG | OXYGEN SATURATION: 98 % | RESPIRATION RATE: 14 BRPM | HEART RATE: 65 BPM | WEIGHT: 193 LBS | DIASTOLIC BLOOD PRESSURE: 68 MMHG

## 2025-03-06 DIAGNOSIS — Z00.00 ENCOUNTER FOR GENERAL ADULT MEDICAL EXAMINATION W/OUT ABNORMAL FINDINGS: ICD-10-CM

## 2025-03-06 DIAGNOSIS — R53.83 OTHER FATIGUE: ICD-10-CM

## 2025-03-06 PROCEDURE — 93000 ELECTROCARDIOGRAM COMPLETE: CPT

## 2025-03-06 PROCEDURE — 99396 PREV VISIT EST AGE 40-64: CPT

## 2025-03-07 ENCOUNTER — RX RENEWAL (OUTPATIENT)
Age: 60
End: 2025-03-07

## 2025-03-10 LAB — HBA1C MFR BLD HPLC: 6.8

## 2025-05-13 ENCOUNTER — NON-APPOINTMENT (OUTPATIENT)
Age: 60
End: 2025-05-13

## 2025-05-14 ENCOUNTER — APPOINTMENT (OUTPATIENT)
Dept: FAMILY MEDICINE | Facility: CLINIC | Age: 60
End: 2025-05-14
Payer: MEDICAID

## 2025-05-14 VITALS
DIASTOLIC BLOOD PRESSURE: 68 MMHG | HEART RATE: 66 BPM | WEIGHT: 182.25 LBS | BODY MASS INDEX: 26.09 KG/M2 | RESPIRATION RATE: 15 BRPM | OXYGEN SATURATION: 97 % | HEIGHT: 70 IN | TEMPERATURE: 97.4 F | SYSTOLIC BLOOD PRESSURE: 104 MMHG

## 2025-05-14 DIAGNOSIS — E11.65 TYPE 2 DIABETES MELLITUS WITH HYPERGLYCEMIA: ICD-10-CM

## 2025-05-14 DIAGNOSIS — F41.9 ANXIETY DISORDER, UNSPECIFIED: ICD-10-CM

## 2025-05-14 LAB — HBA1C MFR BLD HPLC: 5.4

## 2025-05-14 PROCEDURE — 99213 OFFICE O/P EST LOW 20 MIN: CPT

## 2025-05-14 PROCEDURE — G2211 COMPLEX E/M VISIT ADD ON: CPT | Mod: NC

## 2025-05-14 PROCEDURE — 83036 HEMOGLOBIN GLYCOSYLATED A1C: CPT | Mod: QW

## 2025-05-29 ENCOUNTER — APPOINTMENT (OUTPATIENT)
Dept: FAMILY MEDICINE | Facility: CLINIC | Age: 60
End: 2025-05-29
Payer: MEDICAID

## 2025-05-29 VITALS
OXYGEN SATURATION: 98 % | BODY MASS INDEX: 25.34 KG/M2 | RESPIRATION RATE: 15 BRPM | WEIGHT: 177 LBS | SYSTOLIC BLOOD PRESSURE: 104 MMHG | DIASTOLIC BLOOD PRESSURE: 62 MMHG | HEIGHT: 70 IN | HEART RATE: 60 BPM

## 2025-05-29 DIAGNOSIS — R53.83 OTHER FATIGUE: ICD-10-CM

## 2025-05-29 DIAGNOSIS — R21 RASH AND OTHER NONSPECIFIC SKIN ERUPTION: ICD-10-CM
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
## 2025-05-29 PROCEDURE — 99213 OFFICE O/P EST LOW 20 MIN: CPT

## 2025-05-29 PROCEDURE — G2211 COMPLEX E/M VISIT ADD ON: CPT | Mod: NC

## 2025-05-29 RX ORDER — DOXYCYCLINE 100 MG/1
100 CAPSULE ORAL TWICE DAILY
Qty: 42 | Refills: 0 | Status: ACTIVE | COMMUNITY
Start: 2025-05-29 | End: 1900-01-01

## 2025-06-05 ENCOUNTER — APPOINTMENT (OUTPATIENT)
Dept: FAMILY MEDICINE | Facility: CLINIC | Age: 60
End: 2025-06-05
Payer: MEDICAID

## 2025-06-05 VITALS
TEMPERATURE: 98.3 F | BODY MASS INDEX: 25.34 KG/M2 | SYSTOLIC BLOOD PRESSURE: 110 MMHG | WEIGHT: 177 LBS | DIASTOLIC BLOOD PRESSURE: 60 MMHG | RESPIRATION RATE: 14 BRPM | OXYGEN SATURATION: 97 % | HEIGHT: 70 IN | HEART RATE: 71 BPM

## 2025-06-05 DIAGNOSIS — F41.9 ANXIETY DISORDER, UNSPECIFIED: ICD-10-CM

## 2025-06-05 DIAGNOSIS — A69.20 LYME DISEASE, UNSPECIFIED: ICD-10-CM

## 2025-06-05 DIAGNOSIS — H61.23 IMPACTED CERUMEN, BILATERAL: ICD-10-CM

## 2025-06-05 PROCEDURE — G2211 COMPLEX E/M VISIT ADD ON: CPT | Mod: NC

## 2025-06-05 PROCEDURE — 99213 OFFICE O/P EST LOW 20 MIN: CPT

## 2025-06-16 ENCOUNTER — APPOINTMENT (OUTPATIENT)
Dept: FAMILY MEDICINE | Facility: CLINIC | Age: 60
End: 2025-06-16
Payer: MEDICAID

## 2025-06-16 PROCEDURE — 99212 OFFICE O/P EST SF 10 MIN: CPT | Mod: 93

## 2025-06-20 ENCOUNTER — RX RENEWAL (OUTPATIENT)
Age: 60
End: 2025-06-20

## 2025-07-02 PROBLEM — A77.41 EHRLICHIOSIS CHAFEENSIS: Status: ACTIVE | Noted: 2025-07-02

## 2025-09-08 ENCOUNTER — APPOINTMENT (OUTPATIENT)
Dept: FAMILY MEDICINE | Facility: CLINIC | Age: 60
End: 2025-09-08
Payer: MEDICAID

## 2025-09-08 DIAGNOSIS — F41.9 ANXIETY DISORDER, UNSPECIFIED: ICD-10-CM

## 2025-09-08 DIAGNOSIS — E11.65 TYPE 2 DIABETES MELLITUS WITH HYPERGLYCEMIA: ICD-10-CM

## 2025-09-08 PROCEDURE — 99212 OFFICE O/P EST SF 10 MIN: CPT | Mod: 93
